# Patient Record
Sex: FEMALE | Race: WHITE | NOT HISPANIC OR LATINO | Employment: OTHER | URBAN - METROPOLITAN AREA
[De-identification: names, ages, dates, MRNs, and addresses within clinical notes are randomized per-mention and may not be internally consistent; named-entity substitution may affect disease eponyms.]

---

## 2018-02-20 ENCOUNTER — HOSPITAL ENCOUNTER (OUTPATIENT)
Dept: RADIOLOGY | Facility: OTHER | Age: 48
Discharge: HOME | End: 2018-02-20
Attending: OBSTETRICS & GYNECOLOGY
Payer: COMMERCIAL

## 2018-02-20 DIAGNOSIS — Z12.31 VISIT FOR SCREENING MAMMOGRAM: ICD-10-CM

## 2018-02-20 PROCEDURE — 77067 SCR MAMMO BI INCL CAD: CPT | Mod: TC

## 2018-02-20 PROCEDURE — 77063 BREAST TOMOSYNTHESIS BI: CPT | Mod: 26,,, | Performed by: RADIOLOGY

## 2018-02-20 PROCEDURE — 77067 SCR MAMMO BI INCL CAD: CPT | Mod: 26,,, | Performed by: RADIOLOGY

## 2018-04-03 ENCOUNTER — TELEPHONE (OUTPATIENT)
Dept: SURGERY | Facility: CLINIC | Age: 48
End: 2018-04-03

## 2018-04-03 NOTE — TELEPHONE ENCOUNTER
Received call from Dr. Hernandez's office regarding patient's TC score and need for referral to high risk clinic. They requested I call patient to discuss    Called patient and discussed her TC score, what it represents, and how it is calculated. Patient and I discussed the additional monitoring and timeline for this. She would like to think about whether or not she would like to enroll in the program. She states she may call Dr. Hernandez's office to discuss further as well. Gave her my number and encouraged her to call me with any questions/concerns or to get scheduled. Verbalized understanding.

## 2019-03-20 ENCOUNTER — TELEPHONE (OUTPATIENT)
Dept: SURGERY | Facility: CLINIC | Age: 49
End: 2019-03-20

## 2019-03-20 NOTE — TELEPHONE ENCOUNTER
Contacted the patient regarding scheduling high risk screening appointment per 's office.  The patient did not answer.  My name and direct number left on the patient's voicemail for the patient to contact our office regarding this matter.

## 2019-03-22 ENCOUNTER — TELEPHONE (OUTPATIENT)
Dept: SURGERY | Facility: CLINIC | Age: 49
End: 2019-03-22

## 2019-03-22 NOTE — TELEPHONE ENCOUNTER
The patient returned my call regarding scheduling high risk screening appointment.  The patient is scheduled to be seen on Monday 4/8/19 with Reese oMrel NP at 3:30 pm.  The patient voiced understanding of appointment date, time, and location.  Reminder letter mailed to the patient.

## 2019-04-08 ENCOUNTER — OFFICE VISIT (OUTPATIENT)
Dept: SURGERY | Facility: CLINIC | Age: 49
End: 2019-04-08
Payer: COMMERCIAL

## 2019-04-08 VITALS
HEIGHT: 64 IN | TEMPERATURE: 99 F | HEART RATE: 71 BPM | SYSTOLIC BLOOD PRESSURE: 108 MMHG | WEIGHT: 141 LBS | DIASTOLIC BLOOD PRESSURE: 67 MMHG | BODY MASS INDEX: 24.07 KG/M2

## 2019-04-08 DIAGNOSIS — R92.30 DENSE BREAST TISSUE ON MAMMOGRAM: ICD-10-CM

## 2019-04-08 DIAGNOSIS — Z80.3 FAMILY HISTORY OF BREAST CANCER: ICD-10-CM

## 2019-04-08 DIAGNOSIS — Z91.89 AT HIGH RISK FOR BREAST CANCER: ICD-10-CM

## 2019-04-08 DIAGNOSIS — Z12.39 BREAST CANCER SCREENING, HIGH RISK PATIENT: Primary | ICD-10-CM

## 2019-04-08 DIAGNOSIS — N63.10 BILATERAL BREAST LUMP: ICD-10-CM

## 2019-04-08 DIAGNOSIS — N63.20 BILATERAL BREAST LUMP: ICD-10-CM

## 2019-04-08 PROCEDURE — 3008F PR BODY MASS INDEX (BMI) DOCUMENTED: ICD-10-PCS | Mod: CPTII,S$GLB,, | Performed by: NURSE PRACTITIONER

## 2019-04-08 PROCEDURE — 3008F BODY MASS INDEX DOCD: CPT | Mod: CPTII,S$GLB,, | Performed by: NURSE PRACTITIONER

## 2019-04-08 PROCEDURE — 99202 PR OFFICE/OUTPT VISIT, NEW, LEVL II, 15-29 MIN: ICD-10-PCS | Mod: S$GLB,,, | Performed by: NURSE PRACTITIONER

## 2019-04-08 PROCEDURE — 99999 PR PBB SHADOW E&M-EST. PATIENT-LVL III: ICD-10-PCS | Mod: PBBFAC,,, | Performed by: NURSE PRACTITIONER

## 2019-04-08 PROCEDURE — 99999 PR PBB SHADOW E&M-EST. PATIENT-LVL III: CPT | Mod: PBBFAC,,, | Performed by: NURSE PRACTITIONER

## 2019-04-08 PROCEDURE — 99202 OFFICE O/P NEW SF 15 MIN: CPT | Mod: S$GLB,,, | Performed by: NURSE PRACTITIONER

## 2019-04-08 RX ORDER — ESCITALOPRAM OXALATE 20 MG/1
TABLET ORAL
COMMUNITY
Start: 2019-03-20 | End: 2022-09-23 | Stop reason: SDUPTHER

## 2019-04-08 NOTE — PROGRESS NOTES
Patient ID: Kamilah Romero is a 48 y.o. female.    Chief Complaint: Consult (New Patient High Risk TC score 22.17 %)        HPI:  New patient to the breast center presents today for increased risk of breast cancer.    Patient reports L breast LIQ lump, longstanding and unchanged.    Patient denies breast pain, breast-related skin changes, nipple discharge and nipple retraction.      Breast History:    Personal history of breast cancer:  no  Personal history of breast biopsy:  no  Personal history of breast surgery:  no    Breast Imaging  Last mmg 18 report reviewed:  Extremely dense breasts  BI-RADS 1  TC lifetime risk 22.17%    GYN History:  Age of menarche:  unknown  Uterus and ovaries:  intact  LMP:  3/11/19   HRT usage:  never   (had 2 abortions and 1 miscarriage), first live birth at 40 y/o    Other Oncologic History:    Personal history of other cancer not abovementioned:  History of basal and squamous cell skin ca, never melanoma; sees Dermatology every 4-6 mos    Personal history of genetic testing:  no    Family Oncologic History:    Ashkenazi [employer] ancestry:  yes    Family History   Problem Relation Age of Onset    Breast cancer Paternal Grandmother         unk age of onset    Cancer Maternal Grandfather         stomach ca (possibly)    Ovarian cancer Neg Hx      History of genetic testing in relatives:  no      Patient's Breast Cancer Risk Assessment Scores:  Taking into account the above information, the patient's breast cancer risk assessment scores were calculated today as follows:  Mauricio lifetime risk:  32.6%      Review of Systems - See HPI.  Negative for recurrent illness or unexplained fatigue.  Objective:   Physical Exam   Pulmonary/Chest: Effort normal. No respiratory distress. She exhibits no mass, no edema and no deformity. Right breast exhibits mass. Right breast exhibits no inverted nipple, no nipple discharge, no skin change and no tenderness. Left breast exhibits mass.  Left breast exhibits no inverted nipple, no nipple discharge, no skin change and no tenderness. No breast swelling.   Left breast 8oc region about 6cmfn with patient-palpated lump also appreciated by me, 1cm, flat/round, mobile, smooth, nontender.  Right breast 3-4oc region about 4-7cmfn with soft area of thickened tissue, only slightly asymmetrical as compared to corresponding contralateral region; 2.5cm x 2.5cm, flat, soft, mobile, nontender.  Both of above are of low clinical suspicion.   Genitourinary: No breast swelling.   Lymphadenopathy:     She has no cervical adenopathy.        Right: No supraclavicular adenopathy present.        Left: No supraclavicular adenopathy present.   R axilla with a couple of subcentimeter, smooth, round, mobile, nontender LNs appreciated.  L axilla with 1 LN appreciated, approximately 10-12mm in size, soft, mobile, nontender.  Both of above are of low clinical suspicion and likely only appreciable secondary to pt's thin habitus.     Assessment:       1. Breast cancer screening, high risk patient    2. At high risk for breast cancer    3. Dense breast tissue on mammogram    4. Bilateral breast lump    5. Family history of breast cancer          Plan:     HIGH-RISK COUNSELING    Counseled patient regarding her being at increased risk for breast cancer based on risk factors which patient and I discussed today and which were factored into the Tyrer-Cuzick risk assessment model, which estimated a lifetime risk of breast cancer of 32.6% for this patient as of today.  Discussed with patient that there are several models available for stratifying breast cancer risk, and Tyrer-Cuzick is presently the model utilized by [facility] [facility] and is a model recommended per current NCCN guidelines.      Counseled patient regarding modifiable risk factors for breast cancer as recommended by NCCN, including exercising, maintaining a healthy BMI, limiting alcohol consumption to less than one  drink per day, and refraining from smoking.      Discussed with patient current NCCN guideline recommendations for increased breast cancer screening in individuals with a lifetime risk of breast cancer >20%, to include semiannual CBE, along with annual mammogram and annual breast MRI, which would alternate.  Risks and benefits of increased screening with breast MRI were discussed.    Discussed with patient option for genetic counseling through [facility].    Discussed with patient option of speaking with Medical Oncology regarding taking a pharmacologic agent such as tamoxifen or an AI to reduce breast cancer risk.     PLAN    After a thorough discussion, patient elects to proceed with alternating annual mammogram and annual breast MRI along with semiannual CBEs and a referral to [facility] for genetic counseling.    Bilat mmg due now.  Order for this was placed, along with order for bilat breast ltd US for B axilla, R LIQ, and L LIQ findings, all of low clinical suspicion.  If negative/benign, RTC in 6 mos.    Order placed for bilateral breast MRI, to occur in 6 mos along with CBE.  Advised patient to contact the pricing transparency line (phone number provided), prior to undergoing breast MRI, to determine her out-of-pocket cost.  Advised patient to RTC in 6 mos for CBE regardless of whether she elects to undergo breast MRI.  Discussed with pt that she absolutely cannot undergo breast MRI with gadolinium if pregnant and that she must notify me if any chance of pregnancy prior to breast MRI so that a UPT can be performed on the day of the breast MRI, before the breast MRI; pt stated no chance of pregnancy and declined UPT on day of breast MRI.    [facility] paperwork initiated.    Patient declines an appointment in Medical Oncology to discuss the possibility of chemoprevention at this time and was advised to contact clinic with any changes in her decision.    Encouraged breast awareness, including monthly breast  self-exams.  Advised patient to RTC with any interval changes or concerns.  Questions were encouraged and answered to patient's satisfaction, and patient verbalized understanding of information and agreement with the plan.    Time in counselin mins  Total time:  49 mins  >50% of time was spent in face-to-face counseling.

## 2019-04-24 ENCOUNTER — HOSPITAL ENCOUNTER (OUTPATIENT)
Dept: RADIOLOGY | Facility: HOSPITAL | Age: 49
Discharge: HOME | End: 2019-04-24
Attending: NURSE PRACTITIONER
Payer: COMMERCIAL

## 2019-04-24 DIAGNOSIS — N63.10 BILATERAL BREAST LUMP: ICD-10-CM

## 2019-04-24 DIAGNOSIS — N63.20 BILATERAL BREAST LUMP: ICD-10-CM

## 2019-04-24 DIAGNOSIS — Z12.39 BREAST CANCER SCREENING, HIGH RISK PATIENT: ICD-10-CM

## 2019-04-24 PROCEDURE — 76642 ULTRASOUND BREAST LIMITED: CPT | Mod: 26,RT,, | Performed by: RADIOLOGY

## 2019-04-24 PROCEDURE — 77062 BREAST TOMOSYNTHESIS BI: CPT | Mod: TC,PO

## 2019-04-24 PROCEDURE — 76642 ULTRASOUND BREAST LIMITED: CPT | Mod: 26,LT,, | Performed by: RADIOLOGY

## 2019-04-24 PROCEDURE — 76642 PR US BREAST UNILAT LIMITED: ICD-10-PCS | Mod: 26,LT,, | Performed by: RADIOLOGY

## 2019-04-24 PROCEDURE — 77062 MAMMO DIGITAL DIAGNOSTIC BILAT WITH TOMOSYNTHESIS_CAD: ICD-10-PCS | Mod: 26,,, | Performed by: RADIOLOGY

## 2019-04-24 PROCEDURE — 77066 DX MAMMO INCL CAD BI: CPT | Mod: 26,,, | Performed by: RADIOLOGY

## 2019-04-24 PROCEDURE — 77066 MAMMO DIGITAL DIAGNOSTIC BILAT WITH TOMOSYNTHESIS_CAD: ICD-10-PCS | Mod: 26,,, | Performed by: RADIOLOGY

## 2019-04-24 PROCEDURE — 76642 ULTRASOUND BREAST LIMITED: CPT | Mod: TC,50,PO

## 2019-04-24 PROCEDURE — 77062 BREAST TOMOSYNTHESIS BI: CPT | Mod: 26,,, | Performed by: RADIOLOGY

## 2019-04-24 NOTE — PROGRESS NOTES
Aida,    Pt to RTC in around 10/8/19 for breast MRI along with CBE.     Please call pt to make sure she is aware of negative breast US and mmg results.    Thanks,  Reese

## 2019-05-01 ENCOUNTER — DOCUMENTATION ONLY (OUTPATIENT)
Dept: SURGERY | Facility: CLINIC | Age: 49
End: 2019-05-01

## 2019-05-01 NOTE — PROGRESS NOTES
Received fax from [facility] advising that pt has not returned [facility]'s multiple phone calls to schedule genetic counseling appt and that pt can contact their Patient Care team at [phone] if she wishes to initiate services again.  See Media for full notice.

## 2019-07-01 ENCOUNTER — OFFICE VISIT (OUTPATIENT)
Dept: NEUROLOGY | Facility: CLINIC | Age: 49
End: 2019-07-01
Payer: COMMERCIAL

## 2019-07-01 VITALS
DIASTOLIC BLOOD PRESSURE: 77 MMHG | BODY MASS INDEX: 24.1 KG/M2 | WEIGHT: 141.13 LBS | HEIGHT: 64 IN | HEART RATE: 58 BPM | SYSTOLIC BLOOD PRESSURE: 112 MMHG

## 2019-07-01 DIAGNOSIS — G43.109 MIGRAINE WITH AURA AND WITHOUT STATUS MIGRAINOSUS, NOT INTRACTABLE: Primary | ICD-10-CM

## 2019-07-01 PROCEDURE — 3008F BODY MASS INDEX DOCD: CPT | Mod: CPTII,S$GLB,, | Performed by: PSYCHIATRY & NEUROLOGY

## 2019-07-01 PROCEDURE — 99999 PR PBB SHADOW E&M-EST. PATIENT-LVL III: CPT | Mod: PBBFAC,,, | Performed by: PSYCHIATRY & NEUROLOGY

## 2019-07-01 PROCEDURE — 99204 PR OFFICE/OUTPT VISIT, NEW, LEVL IV, 45-59 MIN: ICD-10-PCS | Mod: S$GLB,,, | Performed by: PSYCHIATRY & NEUROLOGY

## 2019-07-01 PROCEDURE — 3008F PR BODY MASS INDEX (BMI) DOCUMENTED: ICD-10-PCS | Mod: CPTII,S$GLB,, | Performed by: PSYCHIATRY & NEUROLOGY

## 2019-07-01 PROCEDURE — 99204 OFFICE O/P NEW MOD 45 MIN: CPT | Mod: S$GLB,,, | Performed by: PSYCHIATRY & NEUROLOGY

## 2019-07-01 PROCEDURE — 99999 PR PBB SHADOW E&M-EST. PATIENT-LVL III: ICD-10-PCS | Mod: PBBFAC,,, | Performed by: PSYCHIATRY & NEUROLOGY

## 2019-07-01 RX ORDER — METHYLPREDNISOLONE 4 MG/1
TABLET ORAL
Qty: 1 PACKAGE | Refills: 0 | Status: SHIPPED | OUTPATIENT
Start: 2019-07-01 | End: 2022-01-06

## 2019-07-01 RX ORDER — SUMATRIPTAN 50 MG/1
50 TABLET, FILM COATED ORAL
Qty: 9 TABLET | Refills: 3 | Status: SHIPPED | OUTPATIENT
Start: 2019-07-01 | End: 2022-08-16

## 2019-07-01 NOTE — PROGRESS NOTES
Subjective:       Patient ID: Kamilah Romero is a 49 y.o. female.    Chief Complaint:  Headache      Consultation Requested by:   Aaareferral Self  No address on file    History of Present Illness  49-year-old female presents for evaluation of 3 weeks of migraine.  The patient notes that previously when she was younger she would have headaches.  She notes that she tried an elimination diet and she notes that not only did her auras go away but her headaches went away as well.  She notes that she started having headaches for 3 weeks starting about a month ago.  She notes that there were no longer food triggered.  She notes that they were not caffeine triggered.  She notes that the major difference was that they were slightly less severe than the headaches that she used to experience several decades ago.  She notes that the pain itself is bitemporal but the aura can occur on the right or on the left in terms of blurring of vision.  She notes that she is noise sensitive and light sensitive with nausea and at 1 point had to make herself vomit to improve her headache.  She was prescribed Maxalt 1st which was ineffective for her recently and Imitrex afterwards which she feels was more effective.  She notes that Advil and Tylenol were ineffective for.  She notes that she has not had a headache for a week.  She notes that about a month ago she had an irregular menstrual cycle.  She denies any blindness, weakness, facial droop, focal neurologic deficit.    History reviewed. No pertinent past medical history.    History reviewed. No pertinent surgical history.    Family History   Problem Relation Age of Onset    Breast cancer Paternal Grandmother         unk age of onset    Cancer Maternal Grandfather         stomach ca (possibly)    Ovarian cancer Neg Hx        Social History     Socioeconomic History    Marital status:      Spouse name: Not on file    Number of children: Not on file    Years of education: Not  on file    Highest education level: Not on file   Occupational History    Not on file   Social Needs    Financial resource strain: Not on file    Food insecurity:     Worry: Not on file     Inability: Not on file    Transportation needs:     Medical: Not on file     Non-medical: Not on file   Tobacco Use    Smoking status: Current Every Day Smoker    Smokeless tobacco: Never Used   Substance and Sexual Activity    Alcohol use: Yes     Alcohol/week: 4.2 oz     Types: 7 Glasses of wine per week    Drug use: Not on file    Sexual activity: Not on file   Lifestyle    Physical activity:     Days per week: Not on file     Minutes per session: Not on file    Stress: Not on file   Relationships    Social connections:     Talks on phone: Not on file     Gets together: Not on file     Attends Pentecostal service: Not on file     Active member of club or organization: Not on file     Attends meetings of clubs or organizations: Not on file     Relationship status: Not on file   Other Topics Concern    Not on file   Social History Narrative    Not on file       Review of Systems  Review of Systems   Eyes: Positive for photophobia and visual disturbance.   Musculoskeletal: Positive for arthralgias and neck stiffness.   Neurological: Positive for headaches.   Psychiatric/Behavioral: Positive for decreased concentration and sleep disturbance.       Objective:     Vitals:    07/01/19 1242   BP: 112/77   Pulse: (!) 58      Physical Exam   Constitutional: She is oriented to person, place, and time. She appears well-developed and well-nourished. No distress.   HENT:   Head: Normocephalic and atraumatic.   Right Ear: Hearing normal.   Left Ear: Hearing normal.   Eyes: Pupils are equal, round, and reactive to light. EOM are normal. Right eye exhibits normal extraocular motion and no nystagmus. Left eye exhibits normal extraocular motion and no nystagmus.   Neck: Normal range of motion. Neck supple. No spinous process  tenderness and no muscular tenderness present. Carotid bruit is not present. No neck rigidity.   Cardiovascular: Exam reveals no S4.   Musculoskeletal: Normal range of motion.   Neurological: She is alert and oriented to person, place, and time. She has normal strength and normal reflexes. She displays no atrophy and no tremor. No cranial nerve deficit or sensory deficit. She exhibits normal muscle tone. She has a normal Finger-Nose-Finger Test and a normal Heel to Lee Test. She displays a negative Romberg sign. Gait normal. Coordination and gait normal. GCS eye subscore is 4. GCS verbal subscore is 5. GCS motor subscore is 6.   Reflex Scores:       Tricep reflexes are 2+ on the right side and 2+ on the left side.       Bicep reflexes are 2+ on the right side and 2+ on the left side.       Brachioradialis reflexes are 2+ on the right side and 2+ on the left side.       Patellar reflexes are 2+ on the right side and 2+ on the left side.       Achilles reflexes are 2+ on the right side and 2+ on the left side.  Fundoscopic exam shows no papilledema, no hemorrhage, no exudates bilaterally.    Cranial nerves 2-12 are without deficit.   Psychiatric: She has a normal mood and affect. Her speech is normal and behavior is normal. Thought content normal.   Vitals reviewed.        NEUROLOGICAL EXAMINATION:     MENTAL STATUS   Oriented to person, place, and time.   Attention: normal. Concentration: normal.   Speech: speech is normal   Level of consciousness: alert  Knowledge: good.   Able to name object. Able to read. Able to repeat. Able to write.     CRANIAL NERVES   Cranial nerves II through XII intact.     CN II   Visual fields full to confrontation.   Visual acuity: normal    CN III, IV, VI   Pupils are equal, round, and reactive to light.  Extraocular motions are normal.     MOTOR EXAM   Muscle bulk: normal  Overall muscle tone: normal  Right arm tone: normal  Left arm tone: normal  Right leg tone: normal  Left leg  tone: normal    Strength   Strength 5/5 throughout.     REFLEXES     Reflexes   Right brachioradialis: 2+  Left brachioradialis: 2+  Right biceps: 2+  Left biceps: 2+  Right triceps: 2+  Left triceps: 2+  Right patellar: 2+  Left patellar: 2+  Right achilles: 2+  Left achilles: 2+  Right : 2+  Left : 2+  Right plantar: normal  Left plantar: normal    SENSORY EXAM   Light touch normal.   Vibration normal.   Proprioception normal.   Pinprick normal.     GAIT AND COORDINATION     Gait  Gait: normal     Coordination   Finger to nose coordination: normal  Heel to shin coordination: normal   I spent over 50% of a 45 min visit in guidance, counseling discussion of treatment options.  Assessment/Plan:     Problem List Items Addressed This Visit        Neuro    Migraine with aura and without status migrainosus, not intractable - Primary    Overview     Plan for Medrol for status migrainosus  Sumatriptan 50mg prn  Has tried Maxalt, Tylenol         Relevant Medications    methylPREDNISolone (MEDROL DOSEPACK) 4 mg tablet    sumatriptan (IMITREX) 50 MG tablet        49-year-old female presents for evaluation of migraine headaches which have worsened in the last month.  At this time we have discussed her keeping a headache diary and watching the pattern of her headaches.  She just started having her menstrual cycle again yesterday but it seemed more normal for her.  She notes some blurring of her vision in her right eye currently but notes no headache today.  At this time we have discussed refilling her Imitrex to be used as needed.  I will also give her a Medrol Dosepak for her to use if she starts having headache that last more than 48 hr at a time.  I will have her track her headaches for 60 days.  At the end of that time I will have her come back to clinic and we will discuss her headache diary.  If she is having more than 6 days of headache per month then we would consider a daily preventive agent.  We have discussed  supplements for migraine at length today and I have given this to her on the after visit summary.  We have discussed the pathophysiology of migraine in the mechanism of action of the medications at length today.      The patient verbalizes understanding and agreement with the treatment plan. Questions were sought and answered to her stated verbal satisfaction.        Alice Sanchez MD    This note is dictated on Dragon Natural Speaking word recognition program. There are word recognition mistakes that are occasionally missed on review.

## 2020-06-11 DIAGNOSIS — Z91.89 AT HIGH RISK FOR BREAST CANCER: Primary | ICD-10-CM

## 2020-07-28 ENCOUNTER — HOSPITAL ENCOUNTER (OUTPATIENT)
Dept: RADIOLOGY | Facility: OTHER | Age: 50
Discharge: HOME | End: 2020-07-28
Attending: PHYSICIAN ASSISTANT
Payer: COMMERCIAL

## 2020-07-28 VITALS — WEIGHT: 141.13 LBS | BODY MASS INDEX: 24.1 KG/M2 | HEIGHT: 64 IN

## 2020-07-28 DIAGNOSIS — Z12.31 VISIT FOR SCREENING MAMMOGRAM: ICD-10-CM

## 2020-07-28 DIAGNOSIS — Z91.89 AT HIGH RISK FOR BREAST CANCER: ICD-10-CM

## 2020-07-28 PROCEDURE — 77063 MAMMO DIGITAL SCREENING BILAT WITH TOMOSYNTHESIS_CAD: ICD-10-PCS | Mod: 26,,, | Performed by: RADIOLOGY

## 2020-07-28 PROCEDURE — 77067 SCR MAMMO BI INCL CAD: CPT | Mod: 26,,, | Performed by: RADIOLOGY

## 2020-07-28 PROCEDURE — 77067 MAMMO DIGITAL SCREENING BILAT WITH TOMOSYNTHESIS_CAD: ICD-10-PCS | Mod: 26,,, | Performed by: RADIOLOGY

## 2020-07-28 PROCEDURE — 77063 BREAST TOMOSYNTHESIS BI: CPT | Mod: 26,,, | Performed by: RADIOLOGY

## 2020-07-28 PROCEDURE — 77067 SCR MAMMO BI INCL CAD: CPT | Mod: TC

## 2020-08-07 ENCOUNTER — TELEPHONE (OUTPATIENT)
Dept: SURGERY | Facility: CLINIC | Age: 50
End: 2020-08-07

## 2020-08-07 NOTE — TELEPHONE ENCOUNTER
Returned patients call. Informed patient that I will speak to LINDA Pack on Monday to verify upcoming appointments with radiology and will get back to her on Monday. Patient verbalized understanding.     ----- Message from Phillip Griggs sent at 8/7/2020 12:11 PM CDT -----  Regarding: call back  Patient called in to speak with someone at the office regarding her upcoming appointment. She would like a call back from the office and can be reached at    [phone]

## 2020-08-10 ENCOUNTER — DOCUMENTATION ONLY (OUTPATIENT)
Dept: SURGERY | Facility: CLINIC | Age: 50
End: 2020-08-10

## 2020-08-10 NOTE — PROGRESS NOTES
Called patient with mammogram results. We discussed that she needs a diagnostic mammogram and u/s for further assessment. This is scheduled for 8/13 but patient wishes to reschedule it for a Wednesday at [facility] and wishes to see me that same day for high risk follow up.    -Sirena

## 2020-08-11 ENCOUNTER — TELEPHONE (OUTPATIENT)
Dept: SURGERY | Facility: CLINIC | Age: 50
End: 2020-08-11

## 2020-08-11 NOTE — TELEPHONE ENCOUNTER
Message was leftfor patient to please call Aida felix @ [facility] @[phone] . Regarding scheduling a High Risk apt with Sirena MCKEON. Sirena only has clinic @ [facility] on a Wednesday .They only do Breast Imaging @ [facility] on Monday & Wednesday so the apt can't be on the same day as the apt . How ever we do have a Breast provider that is @ [facility] on Thursday . If this would work better for you please just let me know.

## 2020-08-11 NOTE — TELEPHONE ENCOUNTER
Message was left for Ms.Melanie Romero regarding her Mammogram apt on 8/13/20 10:30 am @[facility] [address] [facility] [place] [facility] for 11:15 am also. Please call Aida back if you have any questions @ [phone]. Have a great day and please stay safe.

## 2020-08-12 ENCOUNTER — TELEPHONE (OUTPATIENT)
Dept: SURGERY | Facility: CLINIC | Age: 50
End: 2020-08-12

## 2020-08-12 NOTE — TELEPHONE ENCOUNTER
Returned call several times to Ms.Kamilah Romero regarding her message wanting to cancel her apt's for her Breast Imaging on 8/13/20 @ [facility]. Patient's Breast Imaging was canceled . Patient asked to be scheduled to see Sirena MCKEON on 08/26/20 @ 11 am  Patients new apt slip has been mailed out.

## 2020-08-24 ENCOUNTER — TELEPHONE (OUTPATIENT)
Dept: SURGERY | Facility: CLINIC | Age: 50
End: 2020-08-24

## 2020-08-24 NOTE — TELEPHONE ENCOUNTER
Called and left patient a detailed message about moving up her appt with Sirena Hancock to 10:30am from 11am due to the impending weather. She did not answer, left her a brief message with my direct call back number.

## 2020-08-25 ENCOUNTER — TELEPHONE (OUTPATIENT)
Dept: SURGERY | Facility: CLINIC | Age: 50
End: 2020-08-25

## 2020-08-25 NOTE — TELEPHONE ENCOUNTER
Message was left for  to please call Aida felix @ [phone] ext [phone] regarding her apt's tomorrow @ [facility]  to see Sirena Hancock. Have a good afternoon.

## 2020-08-26 ENCOUNTER — TELEPHONE (OUTPATIENT)
Dept: SURGERY | Facility: CLINIC | Age: 50
End: 2020-08-26

## 2020-08-26 NOTE — TELEPHONE ENCOUNTER
Returned the patient's call, she was upset that her appointments were in different locations when she wanted everything at [facility]. Expressed my apologies and explained breast imaging is only done on Mondays and Thursdays at [facility]. She has been rescheduled for 9/17 with Johanna Hinojosa as well as her imaging. She voiced thanks and understanding. Appt letter sent.    ----- Message from Glynn Gustafson sent at 8/26/2020 10:40 AM CDT -----  Regarding: call back        The very irritated Pt would like a call back about what happened with her appts.  The Pt states that Debra is supposed to be calling her back.    Phone # [phone]

## 2020-09-17 ENCOUNTER — HOSPITAL ENCOUNTER (OUTPATIENT)
Dept: RADIOLOGY | Facility: OTHER | Age: 50
Discharge: HOME | End: 2020-09-17
Attending: PHYSICIAN ASSISTANT
Payer: COMMERCIAL

## 2020-09-17 DIAGNOSIS — R92.8 ABNORMAL MAMMOGRAM: ICD-10-CM

## 2020-09-17 PROCEDURE — 77061 BREAST TOMOSYNTHESIS UNI: CPT | Mod: 26,RT,, | Performed by: RADIOLOGY

## 2020-09-17 PROCEDURE — 77061 MAMMO DIGITAL DIAGNOSTIC RIGHT WITH TOMOSYNTHESIS_CAD: ICD-10-PCS | Mod: 26,RT,, | Performed by: RADIOLOGY

## 2020-09-17 PROCEDURE — 77065 MAMMO DIGITAL DIAGNOSTIC RIGHT WITH TOMOSYNTHESIS_CAD: ICD-10-PCS | Mod: 26,RT,, | Performed by: RADIOLOGY

## 2020-09-17 PROCEDURE — 76642 US BREAST RIGHT LIMITED: ICD-10-PCS | Mod: 26,RT,, | Performed by: RADIOLOGY

## 2020-09-17 PROCEDURE — 76642 ULTRASOUND BREAST LIMITED: CPT | Mod: TC,RT

## 2020-09-17 PROCEDURE — 77065 DX MAMMO INCL CAD UNI: CPT | Mod: 26,RT,, | Performed by: RADIOLOGY

## 2020-09-17 PROCEDURE — 76642 ULTRASOUND BREAST LIMITED: CPT | Mod: 26,RT,, | Performed by: RADIOLOGY

## 2020-09-17 PROCEDURE — 77065 DX MAMMO INCL CAD UNI: CPT | Mod: TC,RT

## 2020-09-24 ENCOUNTER — HOSPITAL ENCOUNTER (OUTPATIENT)
Dept: RADIOLOGY | Facility: OTHER | Age: 50
Discharge: HOME | End: 2020-09-24
Attending: PHYSICIAN ASSISTANT
Payer: COMMERCIAL

## 2020-09-24 DIAGNOSIS — R92.8 ABNORMAL MAMMOGRAM: ICD-10-CM

## 2020-09-24 DIAGNOSIS — N63.0 BREAST MASS: Primary | ICD-10-CM

## 2020-09-24 PROCEDURE — 25000003 PHARM REV CODE 250: Performed by: PHYSICIAN ASSISTANT

## 2020-09-24 PROCEDURE — 88305 TISSUE EXAM BY PATHOLOGIST: ICD-10-PCS | Mod: 26,,, | Performed by: PATHOLOGY

## 2020-09-24 PROCEDURE — 19083 BX BREAST 1ST LESION US IMAG: CPT | Mod: RT,,, | Performed by: RADIOLOGY

## 2020-09-24 PROCEDURE — 19083 US BREAST BIOPSY WITH IMAGING 1ST SITE RIGHT: ICD-10-PCS | Mod: RT,,, | Performed by: RADIOLOGY

## 2020-09-24 PROCEDURE — 27201068 US BREAST BIOPSY WITH IMAGING 1ST SITE RIGHT

## 2020-09-24 PROCEDURE — 88305 TISSUE EXAM BY PATHOLOGIST: CPT | Mod: 26,,, | Performed by: PATHOLOGY

## 2020-09-24 PROCEDURE — 88305 TISSUE EXAM BY PATHOLOGIST: CPT | Performed by: PATHOLOGY

## 2020-09-24 RX ORDER — LIDOCAINE HYDROCHLORIDE AND EPINEPHRINE 20; 10 MG/ML; UG/ML
10 INJECTION, SOLUTION INFILTRATION; PERINEURAL ONCE
Status: COMPLETED | OUTPATIENT
Start: 2020-09-24 | End: 2020-09-24

## 2020-09-24 RX ORDER — LIDOCAINE HYDROCHLORIDE 10 MG/ML
5 INJECTION INFILTRATION; PERINEURAL ONCE
Status: COMPLETED | OUTPATIENT
Start: 2020-09-24 | End: 2020-09-24

## 2020-09-24 RX ADMIN — LIDOCAINE HYDROCHLORIDE 5 ML: 10 INJECTION, SOLUTION INFILTRATION; PERINEURAL at 10:09

## 2020-09-24 RX ADMIN — LIDOCAINE HYDROCHLORIDE,EPINEPHRINE BITARTRATE 10 ML: 20; .01 INJECTION, SOLUTION INFILTRATION; PERINEURAL at 10:09

## 2020-09-25 LAB
FINAL PATHOLOGIC DIAGNOSIS: NORMAL
GROSS: NORMAL

## 2020-09-28 ENCOUNTER — TELEPHONE (OUTPATIENT)
Dept: RADIOLOGY | Facility: OTHER | Age: 50
End: 2020-09-28

## 2020-09-28 NOTE — TELEPHONE ENCOUNTER
Patient notified of right breast biopsy results per pathology reported on 9/25/2020. Diagnosis: BENIGN BREAST TISSUE WITH FIBROADENOMATOID CHANGES. Explained to patient results are negative for breast cancer. Instructed on recommendation for BREAST MRI. Patient informed her provider will enter the order and someone will call to schedule the appointment. Questions answered. Discussed with patient the MRI can be more sensitive for findings within the breast. Patient verbalized understanding. Encouraged to call [phone] for further questions or concerns.

## 2020-10-08 ENCOUNTER — PATIENT MESSAGE (OUTPATIENT)
Dept: RADIOLOGY | Facility: HOSPITAL | Age: 50
End: 2020-10-08

## 2020-10-09 DIAGNOSIS — R92.8 ABNORMAL FINDING ON BREAST IMAGING: Primary | ICD-10-CM

## 2020-10-27 ENCOUNTER — TELEPHONE (OUTPATIENT)
Dept: RADIOLOGY | Facility: OTHER | Age: 50
End: 2020-10-27

## 2020-10-27 NOTE — TELEPHONE ENCOUNTER
Patient still outstanding for breast MRI as recommended by radiologist after breast biopsy. Called to follow up. No answer. Left voicemail to return call.

## 2020-10-29 ENCOUNTER — OFFICE VISIT (OUTPATIENT)
Dept: URGENT CARE | Facility: CLINIC | Age: 50
End: 2020-10-29
Payer: COMMERCIAL

## 2020-10-29 VITALS
SYSTOLIC BLOOD PRESSURE: 132 MMHG | TEMPERATURE: 98 F | WEIGHT: 150 LBS | DIASTOLIC BLOOD PRESSURE: 75 MMHG | RESPIRATION RATE: 18 BRPM | OXYGEN SATURATION: 97 % | BODY MASS INDEX: 24.99 KG/M2 | HEIGHT: 65 IN | HEART RATE: 94 BPM

## 2020-10-29 DIAGNOSIS — U07.1 COVID-19 VIRUS DETECTED: ICD-10-CM

## 2020-10-29 DIAGNOSIS — R05.9 COUGH: ICD-10-CM

## 2020-10-29 DIAGNOSIS — U07.1 COVID-19 VIRUS INFECTION: Primary | ICD-10-CM

## 2020-10-29 LAB
CTP QC/QA: YES
SARS-COV-2 RDRP RESP QL NAA+PROBE: POSITIVE

## 2020-10-29 PROCEDURE — U0002 COVID-19 LAB TEST NON-CDC: HCPCS | Mod: QW,S$GLB,, | Performed by: EMERGENCY MEDICINE

## 2020-10-29 PROCEDURE — U0002: ICD-10-PCS | Mod: QW,S$GLB,, | Performed by: EMERGENCY MEDICINE

## 2020-10-29 PROCEDURE — 99203 OFFICE O/P NEW LOW 30 MIN: CPT | Mod: S$GLB,,, | Performed by: EMERGENCY MEDICINE

## 2020-10-29 PROCEDURE — 99203 PR OFFICE/OUTPT VISIT, NEW, LEVL III, 30-44 MIN: ICD-10-PCS | Mod: S$GLB,,, | Performed by: EMERGENCY MEDICINE

## 2020-10-29 RX ORDER — ALBUTEROL SULFATE 90 UG/1
2 AEROSOL, METERED RESPIRATORY (INHALATION) EVERY 4 HOURS PRN
Qty: 18 G | Refills: 0 | Status: SHIPPED | OUTPATIENT
Start: 2020-10-29 | End: 2022-01-06

## 2020-10-29 NOTE — LETTER
[address] [place] [address] ? [place], [address] ? Phone [phone] ? Fax [phone]           Return to Work/School    Patient: Kamilah Romero  YOB: 1970   Date: 10/29/2020      To Whom It May Concern:     Kamilah Romero was in contact with/seen in my office on 10/29/2020. COVID-19 is present in our communities across the state. Not all patients are eligible or appropriate to be tested. In this situation, your employee meets the following criteria:     Kamilah Romero has met the criteria for COVID-19 testing and has a POSITIVE result. She can return to work once they are asymptomatic for 24 hours without the use of fever reducing medications AND at least ten days from the start of symptoms (or from the first positive result if they have no symptoms).      If you have any questions or concerns, or if I can be of further assistance, please do not hesitate to contact me.     Sincerely,      Rudy Hernandez MD

## 2020-10-29 NOTE — PATIENT INSTRUCTIONS
REST AND HYDRATE WITH PLENTY OF FLUIDS  TYLENOL AND IBUPROFEN FOR BODY ACHES/FEVER  MUCINEX DM TWICE DAILY FOR COUGH  ALBUTEROL INHALER RX  COVID SWAB POSITIVE  SEE COVID SHEET    Instructions for Patients with Confirmed or Suspected COVID-19    If you are awaiting your test result, you will either be called or it will be released to the patient portal.  If you have any questions about your test, please visit [email or web address] or call our COVID-19 information line at [phone].      Instructions for non-hospitalized or discharged patients with confirmed or suspected COVID-19:       Stay home except to get medical care.    Separate yourself from other people and animals in your home.    Call ahead before visiting your doctor.    Wear a face mask.    Cover your coughs and sneezes.    Clean your hands often.    Avoid sharing personal household items.    Clean all high-touch surfaces every day.    Monitor your symptoms. Seek prompt medical attention if your illness is worsening (e.g., difficulty breathing). Before seeking care, call your healthcare provider.    If you have a medical emergency and must call 911, notify the dispatcher that you have or are being evaluated for COVID-19. If possible, put on a face mask before emergency medical services arrive.    Use the following symptom-based strategy to return to normal activity following a suspected or confirmed case of COVID-19. Continue isolation until:   o At least 3 days (72 hours) have passed since recovery defined as resolution of fever without the use of fever-reducing medications and improvement in respiratory symptoms (e.g. cough, shortness of breath), and   o At least 10 days have passed since the first positive test.       As one of the next steps, you will receive a call or text from the [place] Department of [facility] ([facility]) COVID-19 Tracing Team. See the contact information below so you know not to ignore the health departments  call. It is important that you contact them back immediately so they can help.     Contact Tracer Number:  [phone]  Caller ID for most carriers: [facility]    What is contact tracing?   Contact tracing is a process that helps identify everyone who has been in close contact with an infected person. Contact tracers let those people know they may have been exposed and guide them on next steps. Confidentiality is important for everyone; no one will be told who may have exposed them to the virus.   Your involvement is important. The more we know about where and how this virus is spreading, the better chance we have at stopping it from spreading further.  What does exposure mean?   Exposure means you have been within 6 feet for more than 15 minutes with a person who has or had COVID-19.  What kind of questions do the contact tracers ask?   A contact tracer will confirm your basic contact information including name, address, phone number, and next of kin, as well as asking about any symptoms you may have had. Theyll also ask you how you think you may have gotten sick, such as places where you may have been exposed to the virus, and people you were with. Those names will never be shared with anyone outside of that call, and will only be used to help trace and stop the spread of the virus.   I have privacy concerns. How will the state use my information?   Your privacy about your health is important. All calls are completed using call centers that use the appropriate health privacy protection measures (HIPAA compliance), meaning that your patient information is safe. No one will ever ask you any questions related to immigration status. Your health comes first.   Do I have to participate?   You do not have to participate, but we strongly encourage you to. Contact tracing can help us catch and control new outbreaks as theyre developing to keep your friends and family safe.   What if I dont hear from  anyone?   If you dont receive a call within 24 hours, you can call the number above right away to inquire about your status. That line is open from 8:00 am - 8:00 p.m., 7 days a week.  Contact tracing saves lives! Together, we have the power to beat this virus and keep our loved ones and neighbors safe.       Instructions for household members, intimate partners and caregivers in a non-healthcare setting of a patient with confirmed or suspected COVID-19:         Close contacts should monitor their health and call their healthcare provider right away if they develop symptoms suggestive of COVID-19 (e.g., fever, cough, shortness of breath).    Stay home except to get medical care. Separate yourself from other people and animals in the home.   Monitor the patients symptoms. If the patient is getting sicker, call his or her healthcare provider. If the patient has a medical emergency and you need to call 911, notify the dispatch personnel that the patient has or is being evaluated for COVID-19.    Wear a facemask when around other people such as sharing a room or vehicle and before entering a healthcare provider's office.   Cover coughs and sneezes with a tissue. Throw used tissues in a lined trash can immediately and wash hands.   Clean hands often with soap and water for at least 20 seconds or with an alcohol-based hand , rubbing hands together until they feel dry. Avoid touching your eyes, nose, and mouth with unwashed hands.   Clean all high-touch; surfaces every day, including counters, tabletops, doorknobs, bathroom fixtures, toilets, phones, keyboards, tablets, bedside tables, etc. Use a household cleaning spray or wipe according to label instructions.   Avoid sharing personal household items such as dishes, drinking glasses, cups, towels, bedding, etc. After these items are used, they should be washed thoroughly with soap and water.   Continue isolation until:   At least 3 days (72 hours)  have passed since recovery defined as resolution of fever without the use of fever-reducing medications and improvement in respiratory symptoms (e.g. cough, shortness of breath), and    At least 10 days have passed since the patients first positive test.    https://www.cdc.gov/coronavirus/2019-ncov/your-health/index.htm  Guidelines for General Prevention of COVID-19    o Take steps to protect yourself from COVID-19. Perform hand hygiene frequently. Wash your hands often with soap and water for at least 20 seconds of use and alcohol-based hand , covering all surfaces of your hands and rubbing them together until they feel dry.  o Avoid touching your eyes, nose, and mouth with unwashed hands.  o Avoid close contact with people and stay home if youre sick, except to get medical care.   o Cover coughs and sneezes with a tissue, or use the inside of your elbow. Immediately wash your hands or use hand .     For more information, see CDC link below:    https://www.cdc.gov/coronavirus/2019-ncov/hcp/guidance-prevent-spread.html#precautions

## 2020-10-29 NOTE — PROGRESS NOTES
"Subjective:       Patient ID: Kamilah Romero is a 50 y.o. female.    Vitals:  height is 5' 4.5" (1.638 m) and weight is 68 kg (150 lb). Her temperature is 97.8 °F (36.6 °C). Her blood pressure is 132/75 and her pulse is 94. Her respiration is 18 and oxygen saturation is 97%.     Chief Complaint: COVID-19 Concerns    This is a 50 y.o. female who presents today with a chief complaint of   Cough, congestion, and body aches. Pt sx started yesterday.     Cough  This is a new problem. The current episode started yesterday. The problem has been gradually worsening. The problem occurs every few minutes. The cough is non-productive. Associated symptoms include headaches and myalgias. Pertinent negatives include no chills, ear pain, eye redness, fever, hemoptysis, rash, sore throat, shortness of breath or wheezing. Nothing aggravates the symptoms. She has tried nothing for the symptoms. The treatment provided no relief.       Constitution: Negative for chills, sweating, fatigue and fever.   HENT: Negative for ear pain, congestion, sinus pain, sinus pressure, sore throat and voice change.    Neck: Negative for painful lymph nodes.   Eyes: Negative for eye redness.   Respiratory: Positive for cough. Negative for chest tightness, sputum production, bloody sputum, COPD, shortness of breath, stridor, wheezing and asthma.    Gastrointestinal: Positive for nausea. Negative for vomiting.   Musculoskeletal: Positive for muscle ache.   Skin: Negative for rash.   Allergic/Immunologic: Negative for seasonal allergies and asthma.   Neurological: Positive for headaches.   Hematologic/Lymphatic: Negative for swollen lymph nodes.       Objective:      Physical Exam   Constitutional: She is oriented to person, place, and time. She appears well-developed. She is cooperative.  Non-toxic appearance. She does not appear ill. No distress.   HENT:   Head: Normocephalic and atraumatic.   Ears:   Right Ear: Hearing, tympanic membrane, external ear " and ear canal normal.   Left Ear: Hearing, tympanic membrane, external ear and ear canal normal.   Nose: Nose normal. No mucosal edema, rhinorrhea or nasal deformity. No epistaxis. Right sinus exhibits no maxillary sinus tenderness and no frontal sinus tenderness. Left sinus exhibits no maxillary sinus tenderness and no frontal sinus tenderness.   Mouth/Throat: Uvula is midline, oropharynx is clear and moist and mucous membranes are normal. No trismus in the jaw. Normal dentition. No uvula swelling. No oropharyngeal exudate, posterior oropharyngeal edema or posterior oropharyngeal erythema.   Eyes: Conjunctivae and lids are normal. No scleral icterus.   Neck: Trachea normal, full passive range of motion without pain and phonation normal. Neck supple. No neck rigidity. No edema and no erythema present.   Cardiovascular: Normal rate, regular rhythm, normal heart sounds and normal pulses.   Pulmonary/Chest: Effort normal and breath sounds normal. No respiratory distress. She has no decreased breath sounds. She has no rhonchi.   Abdominal: Normal appearance.   Musculoskeletal: Normal range of motion.         General: No deformity.   Neurological: She is alert and oriented to person, place, and time. She exhibits normal muscle tone. Coordination normal.   Skin: Skin is warm, dry, intact, not diaphoretic and not pale. Psychiatric: Her speech is normal and behavior is normal. Judgment and thought content normal.   Nursing note and vitals reviewed.        Results for orders placed or performed in visit on 10/29/20   POCT COVID-19 Rapid Screening   Result Value Ref Range    POC Rapid COVID Positive (A) Negative     Acceptable Yes        Assessment:       1. COVID-19 virus infection    2. Cough        Plan:         COVID-19 virus infection    Cough  -     POCT COVID-19 Rapid Screening    Other orders  -     albuterol (PROVENTIL/VENTOLIN HFA) 90 mcg/actuation inhaler; Inhale 2 puffs into the lungs every 4 (four)  hours as needed for Wheezing or Shortness of Breath. Rescue  Dispense: 18 g; Refill: 0         Patient Instructions     REST AND HYDRATE WITH PLENTY OF FLUIDS  TYLENOL AND IBUPROFEN FOR BODY ACHES/FEVER  MUCINEX DM TWICE DAILY FOR COUGH  ALBUTEROL INHALER RX  COVID SWAB POSITIVE  SEE COVID SHEET    Instructions for Patients with Confirmed or Suspected COVID-19    If you are awaiting your test result, you will either be called or it will be released to the patient portal.  If you have any questions about your test, please visit [email or web address] or call our COVID-19 information line at [phone].      Instructions for non-hospitalized or discharged patients with confirmed or suspected COVID-19:       Stay home except to get medical care.    Separate yourself from other people and animals in your home.    Call ahead before visiting your doctor.    Wear a face mask.    Cover your coughs and sneezes.    Clean your hands often.    Avoid sharing personal household items.    Clean all high-touch surfaces every day.    Monitor your symptoms. Seek prompt medical attention if your illness is worsening (e.g., difficulty breathing). Before seeking care, call your healthcare provider.    If you have a medical emergency and must call 911, notify the dispatcher that you have or are being evaluated for COVID-19. If possible, put on a face mask before emergency medical services arrive.    Use the following symptom-based strategy to return to normal activity following a suspected or confirmed case of COVID-19. Continue isolation until:   o At least 3 days (72 hours) have passed since recovery defined as resolution of fever without the use of fever-reducing medications and improvement in respiratory symptoms (e.g. cough, shortness of breath), and   o At least 10 days have passed since the first positive test.       As one of the next steps, you will receive a call or text from the [facility] COVID-19 Tracing Team. See the contact information below so you know not to ignore the health departments call. It is important that you contact them back immediately so they can help.     Contact Tracer Number:  [phone]  Caller ID for most carriers: [facility]    What is contact tracing?   Contact tracing is a process that helps identify everyone who has been in close contact with an infected person. Contact tracers let those people know they may have been exposed and guide them on next steps. Confidentiality is important for everyone; no one will be told who may have exposed them to the virus.   Your involvement is important. The more we know about where and how this virus is spreading, the better chance we have at stopping it from spreading further.  What does exposure mean?   Exposure means you have been within 6 feet for more than 15 minutes with a person who has or had COVID-19.  What kind of questions do the contact tracers ask?   A contact tracer will confirm your basic contact information including name, address, phone number, and next of kin, as well as asking about any symptoms you may have had. Theyll also ask you how you think you may have gotten sick, such as places where you may have been exposed to the virus, and people you were with. Those names will never be shared with anyone outside of that call, and will only be used to help trace and stop the spread of the virus.   I have privacy concerns. How will the state use my information?   Your privacy about your health is important. All calls are completed using call centers that use the appropriate health privacy protection measures (HIPAA compliance), meaning that your patient information is safe. No one will ever ask you any questions related to immigration status. Your health comes first.   Do I have to participate?   You do not have to participate, but we strongly encourage you to. Contact tracing can help us catch  and control new outbreaks as theyre developing to keep your friends and family safe.   What if I dont hear from anyone?   If you dont receive a call within 24 hours, you can call the number above right away to inquire about your status. That line is open from 8:00 am - 8:00 p.m., 7 days a week.  Contact tracing saves lives! Together, we have the power to beat this virus and keep our loved ones and neighbors safe.       Instructions for household members, intimate partners and caregivers in a non-healthcare setting of a patient with confirmed or suspected COVID-19:         Close contacts should monitor their health and call their healthcare provider right away if they develop symptoms suggestive of COVID-19 (e.g., fever, cough, shortness of breath).    Stay home except to get medical care. Separate yourself from other people and animals in the home.   Monitor the patients symptoms. If the patient is getting sicker, call his or her healthcare provider. If the patient has a medical emergency and you need to call 911, notify the dispatch personnel that the patient has or is being evaluated for COVID-19.    Wear a facemask when around other people such as sharing a room or vehicle and before entering a healthcare provider's office.   Cover coughs and sneezes with a tissue. Throw used tissues in a lined trash can immediately and wash hands.   Clean hands often with soap and water for at least 20 seconds or with an alcohol-based hand , rubbing hands together until they feel dry. Avoid touching your eyes, nose, and mouth with unwashed hands.   Clean all high-touch; surfaces every day, including counters, tabletops, doorknobs, bathroom fixtures, toilets, phones, keyboards, tablets, bedside tables, etc. Use a household cleaning spray or wipe according to label instructions.   Avoid sharing personal household items such as dishes, drinking glasses, cups, towels, bedding, etc. After these items are used,  they should be washed thoroughly with soap and water.   Continue isolation until:   At least 3 days (72 hours) have passed since recovery defined as resolution of fever without the use of fever-reducing medications and improvement in respiratory symptoms (e.g. cough, shortness of breath), and    At least 10 days have passed since the patients first positive test.    https://www.cdc.gov/coronavirus/2019-ncov/your-health/index.htm  Guidelines for General Prevention of COVID-19    o Take steps to protect yourself from COVID-19. Perform hand hygiene frequently. Wash your hands often with soap and water for at least 20 seconds of use and alcohol-based hand , covering all surfaces of your hands and rubbing them together until they feel dry.  o Avoid touching your eyes, nose, and mouth with unwashed hands.  o Avoid close contact with people and stay home if youre sick, except to get medical care.   o Cover coughs and sneezes with a tissue, or use the inside of your elbow. Immediately wash your hands or use hand .     For more information, see CDC link below:    https://www.cdc.gov/coronavirus/2019-ncov/hcp/guidance-prevent-spread.html#precautions

## 2020-11-05 ENCOUNTER — PATIENT MESSAGE (OUTPATIENT)
Dept: RADIOLOGY | Facility: OTHER | Age: 50
End: 2020-11-05

## 2020-11-06 ENCOUNTER — TELEPHONE (OUTPATIENT)
Dept: SURGERY | Facility: CLINIC | Age: 50
End: 2020-11-06

## 2020-11-17 ENCOUNTER — HOSPITAL ENCOUNTER (OUTPATIENT)
Dept: RADIOLOGY | Facility: HOSPITAL | Age: 50
Discharge: HOME | End: 2020-11-17
Attending: PHYSICIAN ASSISTANT
Payer: COMMERCIAL

## 2020-11-17 DIAGNOSIS — R92.8 ABNORMAL FINDING ON BREAST IMAGING: ICD-10-CM

## 2020-11-17 PROCEDURE — 25500020 PHARM REV CODE 255: Performed by: PHYSICIAN ASSISTANT

## 2020-11-17 PROCEDURE — A9577 INJ MULTIHANCE: HCPCS | Performed by: PHYSICIAN ASSISTANT

## 2020-11-17 PROCEDURE — 77049 MRI BREAST W/WO CONTRAST, W/CAD, BILATERAL: ICD-10-PCS | Mod: 26,,, | Performed by: RADIOLOGY

## 2020-11-17 PROCEDURE — 77049 MRI BREAST C-+ W/CAD BI: CPT | Mod: TC

## 2020-11-17 PROCEDURE — 77049 MRI BREAST C-+ W/CAD BI: CPT | Mod: 26,,, | Performed by: RADIOLOGY

## 2020-11-17 RX ADMIN — GADOBENATE DIMEGLUMINE 7 ML: 529 INJECTION, SOLUTION INTRAVENOUS at 04:11

## 2020-11-19 ENCOUNTER — TELEPHONE (OUTPATIENT)
Dept: RADIOLOGY | Facility: HOSPITAL | Age: 50
End: 2020-11-19

## 2020-11-19 NOTE — TELEPHONE ENCOUNTER
Spoke with patient. Reviewed breast biopsy procedure and reviewed instructions for breast biopsy. Patient expressed understanding and all questions were answered. Provided patient with my phone number to call for any further concerns or questions.   Patient scheduled breast biopsy at the [facility] on 12/7/2020.

## 2020-11-24 ENCOUNTER — PATIENT MESSAGE (OUTPATIENT)
Dept: RADIOLOGY | Facility: HOSPITAL | Age: 50
End: 2020-11-24

## 2020-12-08 ENCOUNTER — TELEPHONE (OUTPATIENT)
Dept: RADIOLOGY | Facility: HOSPITAL | Age: 50
End: 2020-12-08

## 2020-12-08 NOTE — TELEPHONE ENCOUNTER
Spoke with patient. Reviewed MRI breast biopsy procedure and reviewed instructions for MRI breast biopsy. Patient expressed understanding and all questions were answered. Provided patient with my phone number to call for any further concerns or questions.   Patient scheduled MRI breast biopsy for 12/16/2020.

## 2020-12-16 ENCOUNTER — HOSPITAL ENCOUNTER (OUTPATIENT)
Dept: RADIOLOGY | Facility: HOSPITAL | Age: 50
Discharge: HOME | End: 2020-12-16
Attending: PHYSICIAN ASSISTANT
Payer: COMMERCIAL

## 2020-12-16 DIAGNOSIS — R92.8 ABNORMAL MAMMOGRAM: Primary | ICD-10-CM

## 2020-12-16 DIAGNOSIS — R92.8 ABNORMAL MAMMOGRAM: ICD-10-CM

## 2020-12-16 PROCEDURE — 88341 PR IHC OR ICC EACH ADD'L SINGLE ANTIBODY  STAINPR: ICD-10-PCS | Mod: 26,,, | Performed by: PATHOLOGY

## 2020-12-16 PROCEDURE — 88305 TISSUE EXAM BY PATHOLOGIST: CPT | Mod: 26,,, | Performed by: PATHOLOGY

## 2020-12-16 PROCEDURE — 25500020 PHARM REV CODE 255: Performed by: PHYSICIAN ASSISTANT

## 2020-12-16 PROCEDURE — 19085 MRI BREAST BIOPSY WITH IMAGING 1ST SITE: ICD-10-PCS | Mod: RT,,, | Performed by: RADIOLOGY

## 2020-12-16 PROCEDURE — 25000003 PHARM REV CODE 250: Performed by: PHYSICIAN ASSISTANT

## 2020-12-16 PROCEDURE — 88342 IMHCHEM/IMCYTCHM 1ST ANTB: CPT | Performed by: PATHOLOGY

## 2020-12-16 PROCEDURE — 77065 DX MAMMO INCL CAD UNI: CPT | Mod: TC,RT

## 2020-12-16 PROCEDURE — 88341 IMHCHEM/IMCYTCHM EA ADD ANTB: CPT | Mod: 59 | Performed by: PATHOLOGY

## 2020-12-16 PROCEDURE — 88342 CHG IMMUNOCYTOCHEMISTRY: ICD-10-PCS | Mod: 26,,, | Performed by: PATHOLOGY

## 2020-12-16 PROCEDURE — 88342 IMHCHEM/IMCYTCHM 1ST ANTB: CPT | Mod: 26,,, | Performed by: PATHOLOGY

## 2020-12-16 PROCEDURE — 88305 TISSUE EXAM BY PATHOLOGIST: ICD-10-PCS | Mod: 26,,, | Performed by: PATHOLOGY

## 2020-12-16 PROCEDURE — 88305 TISSUE EXAM BY PATHOLOGIST: CPT | Performed by: PATHOLOGY

## 2020-12-16 PROCEDURE — 19085 BX BREAST 1ST LESION MR IMAG: CPT | Mod: TC

## 2020-12-16 PROCEDURE — A9577 INJ MULTIHANCE: HCPCS | Performed by: PHYSICIAN ASSISTANT

## 2020-12-16 PROCEDURE — 77065 MAMMO DIGITAL DIAGNOSTIC RIGHT: ICD-10-PCS | Mod: 26,RT,, | Performed by: RADIOLOGY

## 2020-12-16 PROCEDURE — 77065 DX MAMMO INCL CAD UNI: CPT | Mod: 26,RT,, | Performed by: RADIOLOGY

## 2020-12-16 PROCEDURE — 19085 BX BREAST 1ST LESION MR IMAG: CPT | Mod: RT,,, | Performed by: RADIOLOGY

## 2020-12-16 PROCEDURE — 88341 IMHCHEM/IMCYTCHM EA ADD ANTB: CPT | Mod: 26,,, | Performed by: PATHOLOGY

## 2020-12-16 PROCEDURE — 27200939 MRI BREAST BIOPSY WITH IMAGING 1ST SITE

## 2020-12-16 RX ORDER — LIDOCAINE HYDROCHLORIDE 10 MG/ML
3 INJECTION, SOLUTION EPIDURAL; INFILTRATION; INTRACAUDAL; PERINEURAL ONCE
Status: COMPLETED | OUTPATIENT
Start: 2020-12-16 | End: 2020-12-16

## 2020-12-16 RX ORDER — LIDOCAINE HYDROCHLORIDE AND EPINEPHRINE 20; 10 MG/ML; UG/ML
18 INJECTION, SOLUTION INFILTRATION; PERINEURAL ONCE
Status: COMPLETED | OUTPATIENT
Start: 2020-12-16 | End: 2020-12-16

## 2020-12-16 RX ADMIN — LIDOCAINE HYDROCHLORIDE 3 ML: 10 INJECTION, SOLUTION EPIDURAL; INFILTRATION; INTRACAUDAL; PERINEURAL at 09:12

## 2020-12-16 RX ADMIN — GADOBENATE DIMEGLUMINE 14 ML: 529 INJECTION, SOLUTION INTRAVENOUS at 08:12

## 2020-12-16 RX ADMIN — LIDOCAINE HYDROCHLORIDE,EPINEPHRINE BITARTRATE 18 ML: 20; .01 INJECTION, SOLUTION INFILTRATION; PERINEURAL at 08:12

## 2020-12-22 LAB
FINAL PATHOLOGIC DIAGNOSIS: NORMAL
GROSS: NORMAL
MICROSCOPIC EXAM: NORMAL

## 2020-12-28 ENCOUNTER — TELEPHONE (OUTPATIENT)
Dept: SURGERY | Facility: CLINIC | Age: 50
End: 2020-12-28

## 2020-12-28 NOTE — TELEPHONE ENCOUNTER
Patient returned call & notified of results of breast biopsy from 12-, no atypia/benign, all questions answered, pt advised to follow up in 6 months with repeat imaging per core biopsy protocol.  reviewed biopsy results and results are benign and concordant. Patient verbalized understanding.

## 2021-04-16 ENCOUNTER — PATIENT MESSAGE (OUTPATIENT)
Dept: RESEARCH | Facility: HOSPITAL | Age: 51
End: 2021-04-16

## 2021-05-03 ENCOUNTER — PATIENT MESSAGE (OUTPATIENT)
Dept: RADIOLOGY | Facility: HOSPITAL | Age: 51
End: 2021-05-03

## 2021-11-30 ENCOUNTER — TELEPHONE (OUTPATIENT)
Dept: OBSTETRICS AND GYNECOLOGY | Facility: CLINIC | Age: 51
End: 2021-11-30
Payer: COMMERCIAL

## 2021-12-01 DIAGNOSIS — Z12.31 ENCOUNTER FOR SCREENING MAMMOGRAM FOR MALIGNANT NEOPLASM OF BREAST: Primary | ICD-10-CM

## 2021-12-02 ENCOUNTER — TELEPHONE (OUTPATIENT)
Dept: OBSTETRICS AND GYNECOLOGY | Facility: CLINIC | Age: 51
End: 2021-12-02
Payer: COMMERCIAL

## 2021-12-17 ENCOUNTER — HOSPITAL ENCOUNTER (OUTPATIENT)
Dept: RADIOLOGY | Facility: OTHER | Age: 51
Discharge: HOME | End: 2021-12-17
Attending: INTERNAL MEDICINE
Payer: COMMERCIAL

## 2021-12-17 DIAGNOSIS — Z12.31 ENCOUNTER FOR SCREENING MAMMOGRAM FOR MALIGNANT NEOPLASM OF BREAST: ICD-10-CM

## 2021-12-17 PROCEDURE — 77063 BREAST TOMOSYNTHESIS BI: CPT | Mod: 26,,, | Performed by: RADIOLOGY

## 2021-12-17 PROCEDURE — 77067 SCR MAMMO BI INCL CAD: CPT | Mod: 26,,, | Performed by: RADIOLOGY

## 2021-12-17 PROCEDURE — 77063 MAMMO DIGITAL SCREENING BILAT WITH TOMO: ICD-10-PCS | Mod: 26,,, | Performed by: RADIOLOGY

## 2021-12-17 PROCEDURE — 77067 MAMMO DIGITAL SCREENING BILAT WITH TOMO: ICD-10-PCS | Mod: 26,,, | Performed by: RADIOLOGY

## 2021-12-17 PROCEDURE — 77067 SCR MAMMO BI INCL CAD: CPT | Mod: TC

## 2022-01-06 ENCOUNTER — CLINICAL SUPPORT (OUTPATIENT)
Dept: AUDIOLOGY | Facility: CLINIC | Age: 52
End: 2022-01-06
Payer: COMMERCIAL

## 2022-01-06 ENCOUNTER — OFFICE VISIT (OUTPATIENT)
Dept: OTOLARYNGOLOGY | Facility: CLINIC | Age: 52
End: 2022-01-06
Payer: COMMERCIAL

## 2022-01-06 VITALS — SYSTOLIC BLOOD PRESSURE: 128 MMHG | HEART RATE: 59 BPM | DIASTOLIC BLOOD PRESSURE: 87 MMHG

## 2022-01-06 DIAGNOSIS — H93.8X2 EAR FULLNESS, LEFT: Primary | ICD-10-CM

## 2022-01-06 DIAGNOSIS — Z01.10 NORMAL HEARING EXAM: ICD-10-CM

## 2022-01-06 DIAGNOSIS — H61.21 IMPACTED CERUMEN OF RIGHT EAR: ICD-10-CM

## 2022-01-06 DIAGNOSIS — H93.293 ABNORMAL AUDITORY PERCEPTION OF BOTH EARS: Primary | ICD-10-CM

## 2022-01-06 PROCEDURE — 92567 PR TYMPA2METRY: ICD-10-PCS | Mod: S$GLB,,, | Performed by: AUDIOLOGIST

## 2022-01-06 PROCEDURE — 99999 PR PBB SHADOW E&M-EST. PATIENT-LVL II: ICD-10-PCS | Mod: PBBFAC,,, | Performed by: NURSE PRACTITIONER

## 2022-01-06 PROCEDURE — 3079F DIAST BP 80-89 MM HG: CPT | Mod: CPTII,S$GLB,, | Performed by: NURSE PRACTITIONER

## 2022-01-06 PROCEDURE — 99203 OFFICE O/P NEW LOW 30 MIN: CPT | Mod: 25,S$GLB,, | Performed by: NURSE PRACTITIONER

## 2022-01-06 PROCEDURE — 1159F MED LIST DOCD IN RCRD: CPT | Mod: CPTII,S$GLB,, | Performed by: NURSE PRACTITIONER

## 2022-01-06 PROCEDURE — 99203 PR OFFICE/OUTPT VISIT, NEW, LEVL III, 30-44 MIN: ICD-10-PCS | Mod: 25,S$GLB,, | Performed by: NURSE PRACTITIONER

## 2022-01-06 PROCEDURE — 1159F PR MEDICATION LIST DOCUMENTED IN MEDICAL RECORD: ICD-10-PCS | Mod: CPTII,S$GLB,, | Performed by: NURSE PRACTITIONER

## 2022-01-06 PROCEDURE — 92557 COMPREHENSIVE HEARING TEST: CPT | Mod: S$GLB,,, | Performed by: AUDIOLOGIST

## 2022-01-06 PROCEDURE — 69210 EAR CERUMEN REMOVAL: ICD-10-PCS | Mod: S$GLB,,, | Performed by: NURSE PRACTITIONER

## 2022-01-06 PROCEDURE — 99999 PR PBB SHADOW E&M-EST. PATIENT-LVL II: CPT | Mod: PBBFAC,,, | Performed by: NURSE PRACTITIONER

## 2022-01-06 PROCEDURE — 69210 REMOVE IMPACTED EAR WAX UNI: CPT | Mod: S$GLB,,, | Performed by: NURSE PRACTITIONER

## 2022-01-06 PROCEDURE — 3074F SYST BP LT 130 MM HG: CPT | Mod: CPTII,S$GLB,, | Performed by: NURSE PRACTITIONER

## 2022-01-06 PROCEDURE — 92567 TYMPANOMETRY: CPT | Mod: S$GLB,,, | Performed by: AUDIOLOGIST

## 2022-01-06 PROCEDURE — 3079F PR MOST RECENT DIASTOLIC BLOOD PRESSURE 80-89 MM HG: ICD-10-PCS | Mod: CPTII,S$GLB,, | Performed by: NURSE PRACTITIONER

## 2022-01-06 PROCEDURE — 3074F PR MOST RECENT SYSTOLIC BLOOD PRESSURE < 130 MM HG: ICD-10-PCS | Mod: CPTII,S$GLB,, | Performed by: NURSE PRACTITIONER

## 2022-01-06 PROCEDURE — 92557 PR COMPREHENSIVE HEARING TEST: ICD-10-PCS | Mod: S$GLB,,, | Performed by: AUDIOLOGIST

## 2022-01-06 NOTE — PROGRESS NOTES
Subjective:      Kamilah Romero is a 51 y.o. female who was self-referred for ear fullness.    She reports left ear fullness x 2 months.  She notes the sensation that there is something in the ear (like wax or water).  She denies any ear pain, ear drainage, hearing loss, or tinnitus.  She has tried mineral oil and hydrogen peroxide without benefit.  She denies any allergic symptoms- itchy, watery eyes, sneezing.  Denies recent URI infection.  Denies any right ear symptoms.  She reports undergoing a lower a facelift in July, but does not think this is related.  There is not a family history of hearing loss at a young age.  There is not a prior history of ear surgery.  There is not a prior history of ear infections.  There is not a history of ear trauma.  She does not wear hearing aids currently.  She has not had a hearing test recently.      Past Medical History  She has no past medical history on file.    Past Surgical History  She has a past surgical history that includes Breast biopsy (Right, 12/2020) and Breast biopsy (Right, 09/2020).    Family History  Her family history includes Breast cancer in her paternal grandmother; Cancer in her maternal grandfather.    Social History  She reports that she quit smoking about 3 years ago. She has never used smokeless tobacco. She reports current alcohol use of about 7.0 standard drinks of alcohol per week.    Allergies  She has No Known Allergies.    Medications  She has a current medication list which includes the following prescription(s): albuterol, escitalopram oxalate, methylprednisolone, and sumatriptan.    Review of Systems     Constitutional: Negative for appetite change, chills, fatigue, fever and unexpected weight loss.      HENT: Negative for ear discharge, ear infection, ear pain, facial swelling, hearing loss, mouth sores, nosebleeds, postnasal drip, ringing in the ears, runny nose, sinus infection, sinus pressure, sore throat, stuffy nose, tonsil  infection, dental problems, trouble swallowing and voice change.      Eyes:  Positive for eye drainage.     Respiratory:  Negative for cough, shortness of breath, sleep apnea, snoring and wheezing.      Cardiovascular:  Negative for chest pain, foot swelling, irregular heartbeat and swollen veins.     Gastrointestinal:  Negative for abdominal pain, acid reflux, constipation, diarrhea, heartburn and vomiting.     Genitourinary: Positive for frequent urination.     Musc: Negative for aching joints, aching muscles, back pain and neck pain.     Skin: Negative for rash.     Allergy: Negative for food allergies and seasonal allergies.     Endocrine: Negative for cold intolerance and heat intolerance.      Neurological: Negative for dizziness, headaches, light-headedness, seizures and tremors.      Hematologic: Negative for bruises/bleeds easily and swollen glands.      Psychiatric: Negative for decreased concentration, depression, nervous/anxious and sleep disturbance.          Objective:   /87   Pulse (!) 59      Constitutional:   She is oriented to person, place, and time. She appears well-developed and well-nourished. She appears alert. She is cooperative.  Non-toxic appearance. She does not have a sickly appearance. She does not appear ill. Normal speech.      Head:  Normocephalic and atraumatic. Not macrocephalic and not microcephalic. Head is without raccoon's eyes, without Cook's sign, without abrasion, without laceration, without right periorbital erythema, without left periorbital erythema and without TMJ tenderness.     Ears:    Right Ear: No lacerations. No drainage, swelling or tenderness. No foreign bodies. No mastoid tenderness. Tympanic membrane is not injected, not scarred, not perforated, not erythematous, not retracted and not bulging. No middle ear effusion. No hemotympanum.   Left Ear: No lacerations. No drainage, swelling or tenderness. No foreign bodies. No mastoid tenderness. Tympanic  membrane is not injected, not scarred, not perforated, not erythematous, not retracted and not bulging.  No middle ear effusion. No hemotympanum.   Significant ceruminous debris obstructing right TM removed under microscopy      Nose:  No mucosal edema, rhinorrhea, septal deviation or polyps. Turbinates normal and no turbinate hypertrophy.  Right sinus exhibits no maxillary sinus tenderness and no frontal sinus tenderness. Left sinus exhibits no maxillary sinus tenderness and no frontal sinus tenderness.     Psychiatric:   She has a normal mood and affect. Her speech is normal and behavior is normal.     Neurological:   She is alert and oriented to person, place, and time. Coordination and gait normal.     Procedure    Cerumen removal performed.  See procedure note.    Data Reviewed  No results found for: WBC  No results found for: EOSINOPHIL  No results found for: EOS  No results found for: PLT  No results found for: GLU  No results found for: IGE    Audiogram    I independently reviewed the tracings of the complete audiometric evaluation performed today.  I reviewed the audiogram with the patient as well.  Pertinent findings include a normal study.  Assessment:     1. Ear fullness, left    2. Normal hearing exam    3. Impacted cerumen of right ear      Plan:     I had a long discussion with the patient regarding her condition and the further workup and management options.      Ear fullness, left       -     Otoscopic exam benign with normal audiometric testing.  No evidence of TMJ.  Unclear etiology, reassurance provided.  Suggested a trial saline nasal spray or Flonase x 1 week.    Normal hearing exam        -     Reviewed patient's reported symptoms, physical exam and audiometric testing interpretation which are consistent with normal hearing bilaterally.         -     Hearing conservation strongly recommended when in noisy environments.          -     Patient encouraged to return to clinic every 2 years for  audiometric testing.    Impacted cerumen of right ear  -     Ear Cerumen Removal

## 2022-01-06 NOTE — PROGRESS NOTES
Kamilah Romero was seen today in the clinic for an audiologic evaluation.  Patient's main complaint was left ear fullness.  Ms. Romero reported that she felt as though there was fluid in her left ear.  She denied otalgia, tinnitus and vertigo.      Tympanometry revealed Type A in the right ear and Type A in the left ear. Audiogram results revealed normal hearing in the right ear and normal hearing in the left ear.  Speech reception thresholds were noted at 5 dB in the right ear and 10 dB in the left ear.  Speech discrimination scores were 100% in the right ear and 100% in the left ear.    Recommendations:  1. Otologic evaluation  2. Repeat audiogram as needed  3. Hearing protection when in noise

## 2022-01-06 NOTE — PROCEDURES
Ear Cerumen Removal    Date/Time: 1/6/2022 11:30 AM  Performed by: Kim Bradley NP  Authorized by: Kim Bradley NP       Local anesthetic:  None  Location details:  Right ear  Procedure type: curette    Cerumen  Removal Results:  Cerumen completely removed  Patient tolerance:  Patient tolerated the procedure well with no immediate complications     Procedure Note:    The patient was brought to the minor procedure room and placed under the operating microscope of the right ear canal which was cleaned of ceruminous debris. Using a combination of suction, curettes and cup forceps the patient's cerumen impaction was removed. The tympanic membrane was evaluated and was unremarkable. The patient tolerated the procedure well. There were no complications.

## 2022-05-03 ENCOUNTER — PATIENT MESSAGE (OUTPATIENT)
Dept: RESEARCH | Facility: HOSPITAL | Age: 52
End: 2022-05-03
Payer: COMMERCIAL

## 2022-08-16 ENCOUNTER — OFFICE VISIT (OUTPATIENT)
Dept: SURGERY | Facility: CLINIC | Age: 52
End: 2022-08-16
Payer: COMMERCIAL

## 2022-08-16 VITALS
BODY MASS INDEX: 24.87 KG/M2 | WEIGHT: 149.25 LBS | HEIGHT: 65 IN | DIASTOLIC BLOOD PRESSURE: 80 MMHG | SYSTOLIC BLOOD PRESSURE: 117 MMHG

## 2022-08-16 DIAGNOSIS — K64.9 HEMORRHOIDS, UNSPECIFIED HEMORRHOID TYPE: Primary | ICD-10-CM

## 2022-08-16 PROCEDURE — 3074F SYST BP LT 130 MM HG: CPT | Mod: CPTII,S$GLB,, | Performed by: NURSE PRACTITIONER

## 2022-08-16 PROCEDURE — 3074F PR MOST RECENT SYSTOLIC BLOOD PRESSURE < 130 MM HG: ICD-10-PCS | Mod: CPTII,S$GLB,, | Performed by: NURSE PRACTITIONER

## 2022-08-16 PROCEDURE — 99214 PR OFFICE/OUTPT VISIT, EST, LEVL IV, 30-39 MIN: ICD-10-PCS | Mod: 25,S$GLB,, | Performed by: NURSE PRACTITIONER

## 2022-08-16 PROCEDURE — 3079F PR MOST RECENT DIASTOLIC BLOOD PRESSURE 80-89 MM HG: ICD-10-PCS | Mod: CPTII,S$GLB,, | Performed by: NURSE PRACTITIONER

## 2022-08-16 PROCEDURE — 46600 PR DIAG2STIC A2SCOPY: ICD-10-PCS | Mod: S$GLB,,, | Performed by: NURSE PRACTITIONER

## 2022-08-16 PROCEDURE — 3008F BODY MASS INDEX DOCD: CPT | Mod: CPTII,S$GLB,, | Performed by: NURSE PRACTITIONER

## 2022-08-16 PROCEDURE — 1159F PR MEDICATION LIST DOCUMENTED IN MEDICAL RECORD: ICD-10-PCS | Mod: CPTII,S$GLB,, | Performed by: NURSE PRACTITIONER

## 2022-08-16 PROCEDURE — 99999 PR PBB SHADOW E&M-EST. PATIENT-LVL III: CPT | Mod: PBBFAC,,, | Performed by: NURSE PRACTITIONER

## 2022-08-16 PROCEDURE — 46600 DIAGNOSTIC ANOSCOPY SPX: CPT | Mod: S$GLB,,, | Performed by: NURSE PRACTITIONER

## 2022-08-16 PROCEDURE — 99999 PR PBB SHADOW E&M-EST. PATIENT-LVL III: ICD-10-PCS | Mod: PBBFAC,,, | Performed by: NURSE PRACTITIONER

## 2022-08-16 PROCEDURE — 1160F RVW MEDS BY RX/DR IN RCRD: CPT | Mod: CPTII,S$GLB,, | Performed by: NURSE PRACTITIONER

## 2022-08-16 PROCEDURE — 1159F MED LIST DOCD IN RCRD: CPT | Mod: CPTII,S$GLB,, | Performed by: NURSE PRACTITIONER

## 2022-08-16 PROCEDURE — 3079F DIAST BP 80-89 MM HG: CPT | Mod: CPTII,S$GLB,, | Performed by: NURSE PRACTITIONER

## 2022-08-16 PROCEDURE — 3008F PR BODY MASS INDEX (BMI) DOCUMENTED: ICD-10-PCS | Mod: CPTII,S$GLB,, | Performed by: NURSE PRACTITIONER

## 2022-08-16 PROCEDURE — 1160F PR REVIEW ALL MEDS BY PRESCRIBER/CLIN PHARMACIST DOCUMENTED: ICD-10-PCS | Mod: CPTII,S$GLB,, | Performed by: NURSE PRACTITIONER

## 2022-08-16 PROCEDURE — 99214 OFFICE O/P EST MOD 30 MIN: CPT | Mod: 25,S$GLB,, | Performed by: NURSE PRACTITIONER

## 2022-08-16 RX ORDER — HYDROCORTISONE 25 MG/G
CREAM TOPICAL 2 TIMES DAILY
Qty: 28 G | Refills: 1 | Status: SHIPPED | OUTPATIENT
Start: 2022-08-16 | End: 2023-02-16

## 2022-08-16 NOTE — PROGRESS NOTES
"CRS Office Visit History and Physical    Referring Md:   Yuriy Dixon Md  [address] [place] [address]    SUBJECTIVE:     Chief Complaint: rectal pain    History of Present Illness:  The patient is new patient to this practice.   Course is as follows:  Patient is a 52 y.o. female presents with rectal pain. She states she had a Child 12 years ago and that when she noticed  Hemorrhoids.   Things have been fine until recently she felt that her hemorrhoids protruded twice. No pain w a BM. No profuse bleeding.   Did not scant BRB on TP with wiping. One BM per day.Stool described as formed, log shaped. Very rarely takes a fiber supplement.     Last Colonoscopy: 3 years ago reported as normal.   No FH of crohns or Colon CA    Review of patient's allergies indicates:  No Known Allergies    No past medical history on file.  Past Surgical History:   Procedure Laterality Date    BREAST BIOPSY Right 12/2020    BREAST BIOPSY Right 09/2020     Family History   Problem Relation Age of Onset    Breast cancer Paternal Grandmother         unk age of onset    Cancer Maternal Grandfather         stomach ca (possibly)    Ovarian cancer Neg Hx      Social History     Tobacco Use    Smoking status: Former Smoker     Quit date: 10/29/2018     Years since quitting: 3.8    Smokeless tobacco: Never Used   Substance Use Topics    Alcohol use: Yes     Alcohol/week: 7.0 standard drinks     Types: 7 Glasses of wine per week        Review of Systems:  Review of Systems   Gastrointestinal:        Hemorrhoid prolapse and irritation       OBJECTIVE:     Vital Signs (Most Recent)  Blood Pressure 117/80 (BP Location: Right arm, Patient Position: Sitting, BP Method: Large (Automatic))   Height 5' 4.5" (1.638 m)   Weight 67.7 kg (149 lb 4 oz)   Body Mass Index 25.22 kg/m²     Physical Exam:  General: White female in no distress   Neuro: Alert and oriented to person, place, and time.  Moves all extremities.     HEENT: No " icterus.  Trachea midline  Respiratory: Respirations are even and unlabored, no cough or audible wheezing  Skin: Warm dry and intact, No visible rashes, no jaundice    Labs reviewed today:  No results found for: WBC, HGB, HCT, PLT, CHOL, TRIG, HDL, LDLDIRECT, ALT, AST, NA, K, CL, CREATININE, BUN, CO2, TSH, PSA, INR, GLUF, HGBA1C, MICROALBUR    Imaging reviewed today:  none    Endoscopy reviewed today:  none    Anorectal Exam:    Anal Skin: Normal    Digital Rectal Exam:  Resting Tone normal  Mass none  Rectocele  absent  Tenderness  absent    Anoscopy:  Verbal consent was obtained.   Clear plastic anoscope inserted.    Hemorrhoids  present  Stigmata of bleeding  present  Stigmata of prolapsed  absent  Distal rectal mucosa normal        ASSESSMENT/PLAN:     Diagnoses and all orders for this visit:    Hemorrhoids, unspecified hemorrhoid type  -     hydrocortisone (ANUSOL-HC) 2.5 % rectal cream; Place rectally 2 (two) times daily.         52 y.o. F here today for recent episode of rectal pain and scant bleeding. Anoscopy showed internal hemorrhoids. Reports she had normal colonoscopy  3 years ago.    The patient was instructed to:  Use anusol rectal cream up to BID if hemorrhoids flare in the future.  Increase water intake to at least 8-10 glasses of water per day.  Take a daily fiber supplement (Konsyl, Benefiber, Metamucil) and increase dietary intake to 20-30 grams/day.  Avoid straining or spending >5min/event with bowel movements.    Follow-up in clinic PRN      BRIELLE Johansen  Colon and Rectal Surgery

## 2022-08-16 NOTE — PATIENT INSTRUCTIONS
Start fiber supplement such as Fibercon (tablet), Citrucel or Metamucil every day, increase as tolerated per  instructions to achieve one to three well formed and easy to pass stools per 7 day period  Water intake of 64 oz per day  May use prescribed cortisone cream to hemorrhoids twice a day not to exceed more than 2 weeks at a time. Take 2 week medication vacation holiday then resume as needed.  Warm sitz baths as needed  Do not sit on the toilet for more than 5 mins at a time

## 2022-09-23 ENCOUNTER — LAB VISIT (OUTPATIENT)
Dept: LAB | Facility: OTHER | Age: 52
End: 2022-09-23
Attending: OBSTETRICS & GYNECOLOGY
Payer: COMMERCIAL

## 2022-09-23 ENCOUNTER — OFFICE VISIT (OUTPATIENT)
Dept: OBSTETRICS AND GYNECOLOGY | Facility: CLINIC | Age: 52
End: 2022-09-23
Attending: OBSTETRICS & GYNECOLOGY
Payer: COMMERCIAL

## 2022-09-23 VITALS
DIASTOLIC BLOOD PRESSURE: 92 MMHG | HEIGHT: 65 IN | SYSTOLIC BLOOD PRESSURE: 121 MMHG | WEIGHT: 146.81 LBS | BODY MASS INDEX: 24.46 KG/M2

## 2022-09-23 DIAGNOSIS — N39.3 SUI (STRESS URINARY INCONTINENCE, FEMALE): ICD-10-CM

## 2022-09-23 DIAGNOSIS — Z12.4 CERVICAL CANCER SCREENING: ICD-10-CM

## 2022-09-23 DIAGNOSIS — Z01.419 WELL WOMAN EXAM WITH ROUTINE GYNECOLOGICAL EXAM: Primary | ICD-10-CM

## 2022-09-23 DIAGNOSIS — Z12.39 SCREENING BREAST EXAMINATION: ICD-10-CM

## 2022-09-23 DIAGNOSIS — Z01.419 WELL WOMAN EXAM WITH ROUTINE GYNECOLOGICAL EXAM: ICD-10-CM

## 2022-09-23 LAB
25(OH)D3+25(OH)D2 SERPL-MCNC: 67 NG/ML (ref 30–96)
ALBUMIN SERPL BCP-MCNC: 4 G/DL (ref 3.5–5.2)
ALP SERPL-CCNC: 30 U/L (ref 55–135)
ALT SERPL W/O P-5'-P-CCNC: 15 U/L (ref 10–44)
ANION GAP SERPL CALC-SCNC: 8 MMOL/L (ref 8–16)
AST SERPL-CCNC: 16 U/L (ref 10–40)
BASOPHILS # BLD AUTO: 0.02 K/UL (ref 0–0.2)
BASOPHILS NFR BLD: 0.5 % (ref 0–1.9)
BILIRUB SERPL-MCNC: 0.5 MG/DL (ref 0.1–1)
BUN SERPL-MCNC: 10 MG/DL (ref 6–20)
CALCIUM SERPL-MCNC: 9.6 MG/DL (ref 8.7–10.5)
CHLORIDE SERPL-SCNC: 104 MMOL/L (ref 95–110)
CO2 SERPL-SCNC: 26 MMOL/L (ref 23–29)
CREAT SERPL-MCNC: 0.7 MG/DL (ref 0.5–1.4)
DIFFERENTIAL METHOD: ABNORMAL
EOSINOPHIL # BLD AUTO: 0.1 K/UL (ref 0–0.5)
EOSINOPHIL NFR BLD: 1.2 % (ref 0–8)
ERYTHROCYTE [DISTWIDTH] IN BLOOD BY AUTOMATED COUNT: 11.9 % (ref 11.5–14.5)
EST. GFR  (NO RACE VARIABLE): >60 ML/MIN/1.73 M^2
ESTIMATED AVG GLUCOSE: 100 MG/DL (ref 68–131)
FERRITIN SERPL-MCNC: 67 NG/ML (ref 20–300)
FSH SERPL-ACNC: 15.08 MIU/ML
GLUCOSE SERPL-MCNC: 95 MG/DL (ref 70–110)
HBA1C MFR BLD: 5.1 % (ref 4–5.6)
HCT VFR BLD AUTO: 42.9 % (ref 37–48.5)
HGB BLD-MCNC: 14.6 G/DL (ref 12–16)
IMM GRANULOCYTES # BLD AUTO: 0.02 K/UL (ref 0–0.04)
IMM GRANULOCYTES NFR BLD AUTO: 0.5 % (ref 0–0.5)
LYMPHOCYTES # BLD AUTO: 1.5 K/UL (ref 1–4.8)
LYMPHOCYTES NFR BLD: 34.9 % (ref 18–48)
MAGNESIUM SERPL-MCNC: 1.9 MG/DL (ref 1.6–2.6)
MCH RBC QN AUTO: 33 PG (ref 27–31)
MCHC RBC AUTO-ENTMCNC: 34 G/DL (ref 32–36)
MCV RBC AUTO: 97 FL (ref 82–98)
MONOCYTES # BLD AUTO: 0.3 K/UL (ref 0.3–1)
MONOCYTES NFR BLD: 6 % (ref 4–15)
NEUTROPHILS # BLD AUTO: 2.5 K/UL (ref 1.8–7.7)
NEUTROPHILS NFR BLD: 56.9 % (ref 38–73)
NRBC BLD-RTO: 0 /100 WBC
PLATELET # BLD AUTO: 223 K/UL (ref 150–450)
PMV BLD AUTO: 11.9 FL (ref 9.2–12.9)
POTASSIUM SERPL-SCNC: 4 MMOL/L (ref 3.5–5.1)
PROT SERPL-MCNC: 6.8 G/DL (ref 6–8.4)
RBC # BLD AUTO: 4.42 M/UL (ref 4–5.4)
SODIUM SERPL-SCNC: 138 MMOL/L (ref 136–145)
T4 FREE SERPL-MCNC: 0.73 NG/DL (ref 0.71–1.51)
TSH SERPL DL<=0.005 MIU/L-ACNC: 1.08 UIU/ML (ref 0.4–4)
WBC # BLD AUTO: 4.3 K/UL (ref 3.9–12.7)

## 2022-09-23 PROCEDURE — 88175 CYTOPATH C/V AUTO FLUID REDO: CPT | Performed by: OBSTETRICS & GYNECOLOGY

## 2022-09-23 PROCEDURE — 87624 HPV HI-RISK TYP POOLED RSLT: CPT | Performed by: OBSTETRICS & GYNECOLOGY

## 2022-09-23 PROCEDURE — 99386 PREV VISIT NEW AGE 40-64: CPT | Mod: S$GLB,,, | Performed by: OBSTETRICS & GYNECOLOGY

## 2022-09-23 PROCEDURE — 3080F DIAST BP >= 90 MM HG: CPT | Mod: CPTII,S$GLB,, | Performed by: OBSTETRICS & GYNECOLOGY

## 2022-09-23 PROCEDURE — 82306 VITAMIN D 25 HYDROXY: CPT | Performed by: OBSTETRICS & GYNECOLOGY

## 2022-09-23 PROCEDURE — 3074F SYST BP LT 130 MM HG: CPT | Mod: CPTII,S$GLB,, | Performed by: OBSTETRICS & GYNECOLOGY

## 2022-09-23 PROCEDURE — 99999 PR PBB SHADOW E&M-EST. PATIENT-LVL IV: ICD-10-PCS | Mod: PBBFAC,,, | Performed by: OBSTETRICS & GYNECOLOGY

## 2022-09-23 PROCEDURE — 1159F MED LIST DOCD IN RCRD: CPT | Mod: CPTII,S$GLB,, | Performed by: OBSTETRICS & GYNECOLOGY

## 2022-09-23 PROCEDURE — 3044F PR MOST RECENT HEMOGLOBIN A1C LEVEL <7.0%: ICD-10-PCS | Mod: CPTII,S$GLB,, | Performed by: OBSTETRICS & GYNECOLOGY

## 2022-09-23 PROCEDURE — 80053 COMPREHEN METABOLIC PANEL: CPT | Performed by: OBSTETRICS & GYNECOLOGY

## 2022-09-23 PROCEDURE — 84443 ASSAY THYROID STIM HORMONE: CPT | Performed by: OBSTETRICS & GYNECOLOGY

## 2022-09-23 PROCEDURE — 1159F PR MEDICATION LIST DOCUMENTED IN MEDICAL RECORD: ICD-10-PCS | Mod: CPTII,S$GLB,, | Performed by: OBSTETRICS & GYNECOLOGY

## 2022-09-23 PROCEDURE — 99999 PR PBB SHADOW E&M-EST. PATIENT-LVL IV: CPT | Mod: PBBFAC,,, | Performed by: OBSTETRICS & GYNECOLOGY

## 2022-09-23 PROCEDURE — 3074F PR MOST RECENT SYSTOLIC BLOOD PRESSURE < 130 MM HG: ICD-10-PCS | Mod: CPTII,S$GLB,, | Performed by: OBSTETRICS & GYNECOLOGY

## 2022-09-23 PROCEDURE — 83036 HEMOGLOBIN GLYCOSYLATED A1C: CPT | Performed by: OBSTETRICS & GYNECOLOGY

## 2022-09-23 PROCEDURE — 83735 ASSAY OF MAGNESIUM: CPT | Performed by: OBSTETRICS & GYNECOLOGY

## 2022-09-23 PROCEDURE — 3080F PR MOST RECENT DIASTOLIC BLOOD PRESSURE >= 90 MM HG: ICD-10-PCS | Mod: CPTII,S$GLB,, | Performed by: OBSTETRICS & GYNECOLOGY

## 2022-09-23 PROCEDURE — 82728 ASSAY OF FERRITIN: CPT | Performed by: OBSTETRICS & GYNECOLOGY

## 2022-09-23 PROCEDURE — 36415 COLL VENOUS BLD VENIPUNCTURE: CPT | Performed by: OBSTETRICS & GYNECOLOGY

## 2022-09-23 PROCEDURE — 83001 ASSAY OF GONADOTROPIN (FSH): CPT | Performed by: OBSTETRICS & GYNECOLOGY

## 2022-09-23 PROCEDURE — 85025 COMPLETE CBC W/AUTO DIFF WBC: CPT | Performed by: OBSTETRICS & GYNECOLOGY

## 2022-09-23 PROCEDURE — 3044F HG A1C LEVEL LT 7.0%: CPT | Mod: CPTII,S$GLB,, | Performed by: OBSTETRICS & GYNECOLOGY

## 2022-09-23 PROCEDURE — 84439 ASSAY OF FREE THYROXINE: CPT | Performed by: OBSTETRICS & GYNECOLOGY

## 2022-09-23 PROCEDURE — 99386 PR PREVENTIVE VISIT,NEW,40-64: ICD-10-PCS | Mod: S$GLB,,, | Performed by: OBSTETRICS & GYNECOLOGY

## 2022-09-23 RX ORDER — PHENOBARBITAL 64.8 MG/1
TABLET ORAL
COMMUNITY
Start: 2022-08-03 | End: 2023-02-16

## 2022-09-23 RX ORDER — DIAZEPAM 10 MG/1
10 TABLET ORAL DAILY PRN
COMMUNITY
Start: 2022-07-07 | End: 2023-02-16

## 2022-09-23 RX ORDER — ESCITALOPRAM OXALATE 20 MG/1
20 TABLET ORAL DAILY
Qty: 90 TABLET | Refills: 3 | Status: SHIPPED | OUTPATIENT
Start: 2022-09-23 | End: 2023-10-02

## 2022-09-23 NOTE — PROGRESS NOTES
"CC: Well woman exam    Kamilah Romero is a 52 y.o. female  presents for well woman exam.  LMP: Patient's last menstrual period was 09/10/2022..  transfer of care from Dr. Garcia who retired. Periods are still occurring, seem to be further apart. No hot flashes or night sweats.  No abnormal pap smears.  Does report some CELE.    Past Medical History:   Diagnosis Date    Depression     stable     Past Surgical History:   Procedure Laterality Date    BREAST BIOPSY Right 2020    BREAST BIOPSY Right 2020    VAGINAL DELIVERY       Social History     Socioeconomic History    Marital status:    Tobacco Use    Smoking status: Former     Types: Cigarettes     Quit date: 10/29/2018     Years since quitting: 3.9    Smokeless tobacco: Never   Substance and Sexual Activity    Alcohol use: Yes     Alcohol/week: 7.0 standard drinks     Types: 7 Glasses of wine per week    Drug use: Never    Sexual activity: Yes     Partners: Male     Family History   Problem Relation Age of Onset    Breast cancer Paternal Grandmother         unk age of onset    Cancer Maternal Grandfather         stomach ca (possibly)    Ovarian cancer Neg Hx      OB History          1    Para   1    Term   1            AB        Living             SAB        IAB        Ectopic        Multiple        Live Births                     BP (!) 121/92 (BP Location: Right arm, Patient Position: Sitting)   Ht 5' 4.5" (1.638 m)   Wt 66.6 kg (146 lb 13.2 oz)   LMP 09/10/2022 Comment: 22  BMI 24.81 kg/m²       ROS:  GENERAL: Denies weight gain or weight loss. Feeling well overall.   SKIN: Denies rash or lesions.   HEAD: Denies head injury or headache.   NODES: Denies enlarged lymph nodes.   CHEST: Denies chest pain or shortness of breath.   CARDIOVASCULAR: Denies palpitations or left sided chest pain.   ABDOMEN: No abdominal pain, constipation, diarrhea, nausea, vomiting or rectal bleeding.   URINARY: No frequency, dysuria, " hematuria, or burning on urination.  REPRODUCTIVE: See HPI.   BREASTS: The patient performs breast self-examination and denies pain, lumps, or nipple discharge.   HEMATOLOGIC: No easy bruisability or excessive bleeding.   MUSCULOSKELETAL: Denies joint pain or swelling.   NEUROLOGIC: Denies syncope or weakness.   PSYCHIATRIC: Denies depression, anxiety or mood swings.    PHYSICAL EXAM:  APPEARANCE: Well nourished, well developed, in no acute distress.  AFFECT: WNL, alert and oriented x 3  SKIN: No acne or hirsutism  NECK: Neck symmetric without masses or thyromegaly  NODES: No inguinal, cervical, axillary, or femoral lymph node enlargement  CHEST: Good respiratory effect  ABDOMEN: Soft.  No tenderness or masses.  No hepatosplenomegaly.  No hernias.  BREASTS: Symmetrical, no skin changes or visible lesions.  No palpable masses, nipple discharge bilaterally.  PELVIC: Normal external genitalia without lesions.  Normal hair distribution.  Adequate perineal body, normal urethral meatus.  Vagina moist and well rugated without lesions or discharge.  Cervix pink, without lesions, discharge or tenderness.  No significant cystocele or rectocele.  Bimanual exam shows uterus to be normal size, regular, mobile and nontender.  Adnexa without masses or tenderness.    EXTREMITIES: No edema.    Well woman exam with routine gynecological exam  -     CBC Auto Differential; Future; Expected date: 09/23/2022  -     Comprehensive Metabolic Panel; Future; Expected date: 09/23/2022  -     Hemoglobin A1C; Future; Expected date: 09/23/2022  -     TSH; Future; Expected date: 09/23/2022  -     T4, Free; Future; Expected date: 09/23/2022  -     Vitamin D; Future; Expected date: 09/23/2022  -     FERRITIN; Future; Expected date: 09/23/2022  -     Follicle Stimulating Hormone; Future; Expected date: 09/23/2022  -     Magnesium; Future; Expected date: 09/23/2022  -     EScitalopram oxalate (LEXAPRO) 20 MG tablet; Take 1 tablet (20 mg total) by  mouth once daily.  Dispense: 90 tablet; Refill: 3    Screening breast examination  -     Mammo Digital Screening Bilat w/ Willie; Future; Expected date: 09/23/2022    CELE (stress urinary incontinence, female)  -     Ambulatory referral/consult to Physical/Occupational Therapy; Future; Expected date: 09/30/2022          Patient was counseled today on A.C.S. Pap guidelines and recommendations for yearly pelvic exams, mammograms and monthly self breast exams; to see her PCP for other health maintenance.     No follow-ups on file.

## 2022-09-25 ENCOUNTER — PATIENT MESSAGE (OUTPATIENT)
Dept: OBSTETRICS AND GYNECOLOGY | Facility: CLINIC | Age: 52
End: 2022-09-25
Payer: COMMERCIAL

## 2022-09-28 LAB
FINAL PATHOLOGIC DIAGNOSIS: NORMAL
HPV HR 12 DNA SPEC QL NAA+PROBE: NEGATIVE
HPV16 AG SPEC QL: NEGATIVE
HPV18 DNA SPEC QL NAA+PROBE: NEGATIVE
Lab: NORMAL

## 2022-11-17 ENCOUNTER — CLINICAL SUPPORT (OUTPATIENT)
Dept: REHABILITATION | Facility: OTHER | Age: 52
End: 2022-11-17
Attending: OBSTETRICS & GYNECOLOGY
Payer: COMMERCIAL

## 2022-11-17 DIAGNOSIS — R27.9 LACK OF COORDINATION: ICD-10-CM

## 2022-11-17 DIAGNOSIS — N39.3 SUI (STRESS URINARY INCONTINENCE, FEMALE): ICD-10-CM

## 2022-11-17 PROCEDURE — 97112 NEUROMUSCULAR REEDUCATION: CPT

## 2022-11-17 PROCEDURE — 97161 PT EVAL LOW COMPLEX 20 MIN: CPT

## 2022-11-17 PROCEDURE — 97530 THERAPEUTIC ACTIVITIES: CPT
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
## 2022-11-18 ENCOUNTER — HOSPITAL ENCOUNTER (OUTPATIENT)
Dept: RADIOLOGY | Facility: OTHER | Age: 52
Discharge: HOME | End: 2022-11-18
Attending: OBSTETRICS & GYNECOLOGY
Payer: COMMERCIAL

## 2022-11-18 DIAGNOSIS — Z12.39 SCREENING BREAST EXAMINATION: ICD-10-CM

## 2022-11-18 DIAGNOSIS — Z12.31 BREAST CANCER SCREENING BY MAMMOGRAM: ICD-10-CM

## 2022-11-18 PROCEDURE — 77063 BREAST TOMOSYNTHESIS BI: CPT | Mod: TC

## 2022-11-18 PROCEDURE — 77067 MAMMO DIGITAL SCREENING BILAT WITH TOMO: ICD-10-PCS | Mod: 26,,, | Performed by: RADIOLOGY

## 2022-11-18 PROCEDURE — 77067 SCR MAMMO BI INCL CAD: CPT | Mod: TC

## 2022-11-18 PROCEDURE — 77063 BREAST TOMOSYNTHESIS BI: CPT | Mod: 26,,, | Performed by: RADIOLOGY

## 2022-11-18 PROCEDURE — 77063 MAMMO DIGITAL SCREENING BILAT WITH TOMO: ICD-10-PCS | Mod: 26,,, | Performed by: RADIOLOGY

## 2022-11-18 PROCEDURE — 77067 SCR MAMMO BI INCL CAD: CPT | Mod: 26,,, | Performed by: RADIOLOGY

## 2023-02-13 ENCOUNTER — TELEPHONE (OUTPATIENT)
Dept: PRIMARY CARE CLINIC | Facility: CLINIC | Age: 53
End: 2023-02-13
Payer: COMMERCIAL

## 2023-02-13 NOTE — TELEPHONE ENCOUNTER
Pt scheduled 02/16 @ 11 am. Tried to call pt, Called pt, no answer-no voicemail. Will try again later

## 2023-02-13 NOTE — TELEPHONE ENCOUNTER
----- Message from Chrissy Ladd sent at 2/13/2023 12:46 PM CST -----  Contact: pt, [phone]  New patient states she's tried repeatedly to schedule with you on the 11:30 am slot on 2/16. Patient requests a call back today to confirm her appointment. Please advise, provider's schedule does not populate in Epic.

## 2023-02-16 ENCOUNTER — OFFICE VISIT (OUTPATIENT)
Dept: PRIMARY CARE CLINIC | Facility: CLINIC | Age: 53
End: 2023-02-16
Payer: COMMERCIAL

## 2023-02-16 VITALS
HEIGHT: 64 IN | DIASTOLIC BLOOD PRESSURE: 78 MMHG | SYSTOLIC BLOOD PRESSURE: 118 MMHG | BODY MASS INDEX: 24.46 KG/M2 | WEIGHT: 143.31 LBS | HEART RATE: 63 BPM | RESPIRATION RATE: 16 BRPM | OXYGEN SATURATION: 97 %

## 2023-02-16 DIAGNOSIS — F32.A ANXIETY AND DEPRESSION: ICD-10-CM

## 2023-02-16 DIAGNOSIS — Z00.00 PREVENTATIVE HEALTH CARE: ICD-10-CM

## 2023-02-16 DIAGNOSIS — R42 DIZZINESS: Primary | ICD-10-CM

## 2023-02-16 DIAGNOSIS — F41.9 ANXIETY AND DEPRESSION: ICD-10-CM

## 2023-02-16 PROBLEM — U09.9 COVID-19 LONG HAULER MANIFESTING CHRONIC NEUROLOGIC SYMPTOMS: Status: RESOLVED | Noted: 2023-02-16 | Resolved: 2023-02-16

## 2023-02-16 PROBLEM — R27.9 LACK OF COORDINATION: Status: RESOLVED | Noted: 2022-11-17 | Resolved: 2023-02-16

## 2023-02-16 PROBLEM — H83.03: Status: ACTIVE | Noted: 2023-02-16

## 2023-02-16 PROBLEM — R29.90 COVID-19 LONG HAULER MANIFESTING CHRONIC NEUROLOGIC SYMPTOMS: Status: RESOLVED | Noted: 2023-02-16 | Resolved: 2023-02-16

## 2023-02-16 PROBLEM — U09.9 COVID-19 LONG HAULER MANIFESTING CHRONIC NEUROLOGIC SYMPTOMS: Status: ACTIVE | Noted: 2023-02-16

## 2023-02-16 PROBLEM — R29.90 COVID-19 LONG HAULER MANIFESTING CHRONIC NEUROLOGIC SYMPTOMS: Status: ACTIVE | Noted: 2023-02-16

## 2023-02-16 PROBLEM — G43.109 MIGRAINE WITH AURA AND WITHOUT STATUS MIGRAINOSUS, NOT INTRACTABLE: Status: RESOLVED | Noted: 2019-07-01 | Resolved: 2023-02-16

## 2023-02-16 PROCEDURE — 99215 OFFICE O/P EST HI 40 MIN: CPT | Mod: 25,S$GLB,, | Performed by: INTERNAL MEDICINE

## 2023-02-16 PROCEDURE — 99215 PR OFFICE/OUTPT VISIT, EST, LEVL V, 40-54 MIN: ICD-10-PCS | Mod: 25,S$GLB,, | Performed by: INTERNAL MEDICINE

## 2023-02-16 PROCEDURE — 3078F PR MOST RECENT DIASTOLIC BLOOD PRESSURE < 80 MM HG: ICD-10-PCS | Mod: CPTII,S$GLB,, | Performed by: INTERNAL MEDICINE

## 2023-02-16 PROCEDURE — 3074F PR MOST RECENT SYSTOLIC BLOOD PRESSURE < 130 MM HG: ICD-10-PCS | Mod: CPTII,S$GLB,, | Performed by: INTERNAL MEDICINE

## 2023-02-16 PROCEDURE — 3008F PR BODY MASS INDEX (BMI) DOCUMENTED: ICD-10-PCS | Mod: CPTII,S$GLB,, | Performed by: INTERNAL MEDICINE

## 2023-02-16 PROCEDURE — 99999 PR PBB SHADOW E&M-EST. PATIENT-LVL III: ICD-10-PCS | Mod: PBBFAC,,, | Performed by: INTERNAL MEDICINE

## 2023-02-16 PROCEDURE — 3074F SYST BP LT 130 MM HG: CPT | Mod: CPTII,S$GLB,, | Performed by: INTERNAL MEDICINE

## 2023-02-16 PROCEDURE — 3008F BODY MASS INDEX DOCD: CPT | Mod: CPTII,S$GLB,, | Performed by: INTERNAL MEDICINE

## 2023-02-16 PROCEDURE — 99999 PR PBB SHADOW E&M-EST. PATIENT-LVL III: CPT | Mod: PBBFAC,,, | Performed by: INTERNAL MEDICINE

## 2023-02-16 PROCEDURE — 3078F DIAST BP <80 MM HG: CPT | Mod: CPTII,S$GLB,, | Performed by: INTERNAL MEDICINE

## 2023-02-16 RX ORDER — MECLIZINE HCL 12.5 MG 12.5 MG/1
12.5 TABLET ORAL 3 TIMES DAILY PRN
Qty: 30 TABLET | Refills: 3 | Status: SHIPPED | OUTPATIENT
Start: 2023-02-16 | End: 2023-12-07 | Stop reason: CLARIF

## 2023-02-16 NOTE — ASSESSMENT & PLAN NOTE
-possibly post-COVID sequelae  Celestone 6mg IM  -start Antivert 12.5 mg tid prn dizziness   Call Dr. Tucker if not improved

## 2023-02-16 NOTE — PROGRESS NOTES
[facility] Progress Note      Chief Complaint     Establish Care      Subjective:      History of Present Illness:  Kamilah Romero is a 52 y.o. female here to establish care - labs drawn 9/2022 reviewed - all WNL    COVID #2: had very mild URI symptoms without fever that lasted approx 10 days on Jan 16, 2023 followed by chronic dizziness, brain fog, and light-headedness; no blurry vision; had full eye exam WNL; denies anosmia, poor concentration, appetite change, tinnitus, hearing loss, vertigo, N/V, headache; does not prevent pt from sleeping, no nasal discharge or stuffiness, no facial pain. Not improved with Epley maneuver, does not prevent pt from exercising or performing ADLs.  -denies head trauma    Depression/Anxiety:  well-controlled on lexapro 20mg daily; sees therapist qowk    Menses: usually regular and monthly     SH: social alcohol qod;  with one son;  works as Molecular Sensing teacher on [employer]   Dad alive 88 y/o; mom HTN, tobacco 83 y/o     Past Medical History:  Past Medical History:   Diagnosis Date    COVID-19 long hauler manifesting chronic neurologic symptoms 2/16/2023    Depression     stable    Lack of coordination 11/17/2022    Migraine with aura and without status migrainosus, not intractable 7/1/2019    Plan for Medrol for status migrainosus Sumatriptan 50mg prn Has tried Maxalt, Tylenol       Past Surgical History:  Past Surgical History:   Procedure Laterality Date    BREAST BIOPSY Right 12/2020    BREAST BIOPSY Right 09/2020    VAGINAL DELIVERY         Allergies:  Review of patient's allergies indicates:  No Known Allergies    Home Medications:  Current Outpatient Medications   Medication Sig Dispense Refill    EScitalopram oxalate (LEXAPRO) 20 MG tablet Take 1 tablet (20 mg total) by mouth once daily. 90 tablet 3    meclizine (ANTIVERT) 12.5 mg tablet Take 1 tablet (12.5 mg total) by mouth 3 (three) times daily as needed for Dizziness. 30 tablet 3  "    No current facility-administered medications for this visit.        Family History:  Family History   Problem Relation Age of Onset    Breast cancer Paternal Grandmother         unk age of onset    Cancer Maternal Grandfather         stomach ca (possibly)    Ovarian cancer Neg Hx        Social History:  Social History     Tobacco Use    Smoking status: Former     Types: Cigarettes     Quit date: 10/29/2018     Years since quittin.3    Smokeless tobacco: Never   Substance Use Topics    Alcohol use: Yes     Alcohol/week: 7.0 standard drinks     Types: 7 Glasses of wine per week    Drug use: Never       Review of Systems:  Pertinent positives and negatives listed in HPI. All other systems are reviewed and are negative.    Health Maintaince :   Health Maintenance Topics with due status: Not Due       Topic Last Completion Date    Colorectal Cancer Screening 05/15/2019    Cervical Cancer Screening 2022    Mammogram 2022           Eye:  WNL   Dental: UTD  Hearing Eval: normal 2022    Immunizations:   Tdap: n/a.  Influenza: not.  Pneumovax: n/a  Shingrex x 2:  needs  COVID: x 2; refuses booster  Hepatitis C:   Cancer Screening:  PAP: UTD 2022 neg HPV and cytology   Mammogram: UTD 2022  Colonoscopy: 2019 repeat 2029      The ASCVD Risk score (Lisandro NELSON, et al., 2019) failed to calculate for the following reasons:    Cannot find a previous HDL lab    Cannot find a previous total cholesterol lab      Objective:   /78 (BP Location: Right arm, Patient Position: Sitting, BP Method: Medium (Manual))   Pulse 63   Resp 16   Ht 5' 4" (1.626 m)   Wt 65 kg (143 lb 4.8 oz)   SpO2 97%   BMI 24.60 kg/m²      Body mass index is 24.6 kg/m².       Physical Examination:  General: Alert and awake in no apparent distress  HEENT: Normocephalic and atraumatic; Tms WNL  Eyes:  PERRL; EOMi with anicteric sclera and clear conjunctivae; no nystagmus, no photophobia  Mouth:  Oropharynx clear and without " exudate; moist mucous membranes  Neck:   Cervical nodes not enlarged; supple; no bruits  Cardio:  Regular rate and rhythm with normal S1 and S2; no murmurs or rubs  Resp:  CTAB; respirations unlabored; no wheezes, crackles or rhonchi  Abdom: Soft, NTND with normoactive bowel sounds; negative HSM  Extrem: Warm and well-perfused with no clubbing, cyanosis or edema  Skin:  No rashes, lesions, or color changes  Pulses:  2+ and symmetric distally  Neuro:  AAOx3; cooperative and pleasant with no focal deficits    Laboratory:      Most Recent Data:  Lab Results   Component Value Date    WBC 4.30 09/23/2022    HGB 14.6 09/23/2022    HCT 42.9 09/23/2022     09/23/2022    ALT 15 09/23/2022    AST 16 09/23/2022     09/23/2022    K 4.0 09/23/2022     09/23/2022    BUN 10 09/23/2022    CO2 26 09/23/2022    TSH 1.082 09/23/2022    HGBA1C 5.1 09/23/2022              CBC:   WBC   Date Value Ref Range Status   09/23/2022 4.30 3.90 - 12.70 K/uL Final     Hemoglobin   Date Value Ref Range Status   09/23/2022 14.6 12.0 - 16.0 g/dL Final     Hematocrit   Date Value Ref Range Status   09/23/2022 42.9 37.0 - 48.5 % Final     Platelets   Date Value Ref Range Status   09/23/2022 223 150 - 450 K/uL Final     MCV   Date Value Ref Range Status   09/23/2022 97 82 - 98 fL Final     RDW   Date Value Ref Range Status   09/23/2022 11.9 11.5 - 14.5 % Final     BMP:   Sodium   Date Value Ref Range Status   09/23/2022 138 136 - 145 mmol/L Final     Potassium   Date Value Ref Range Status   09/23/2022 4.0 3.5 - 5.1 mmol/L Final     Chloride   Date Value Ref Range Status   09/23/2022 104 95 - 110 mmol/L Final     CO2   Date Value Ref Range Status   09/23/2022 26 23 - 29 mmol/L Final     BUN   Date Value Ref Range Status   09/23/2022 10 6 - 20 mg/dL Final     Creatinine   Date Value Ref Range Status   09/23/2022 0.7 0.5 - 1.4 mg/dL Final     Glucose   Date Value Ref Range Status   09/23/2022 95 70 - 110 mg/dL Final     Calcium   Date  Value Ref Range Status   09/23/2022 9.6 8.7 - 10.5 mg/dL Final     Magnesium   Date Value Ref Range Status   09/23/2022 1.9 1.6 - 2.6 mg/dL Final     LFTs:   Total Protein   Date Value Ref Range Status   09/23/2022 6.8 6.0 - 8.4 g/dL Final     Albumin   Date Value Ref Range Status   09/23/2022 4.0 3.5 - 5.2 g/dL Final     Total Bilirubin   Date Value Ref Range Status   09/23/2022 0.5 0.1 - 1.0 mg/dL Final     Comment:     For infants and newborns, interpretation of results should be based  on gestational age, weight and in agreement with clinical  observations.    Premature Infant recommended reference ranges:  Up to 24 hours.............<8.0 mg/dL  Up to 48 hours............<12.0 mg/dL  3-5 days..................<15.0 mg/dL  6-29 days.................<15.0 mg/dL       AST   Date Value Ref Range Status   09/23/2022 16 10 - 40 U/L Final     Alkaline Phosphatase   Date Value Ref Range Status   09/23/2022 30 (L) 55 - 135 U/L Final     ALT   Date Value Ref Range Status   09/23/2022 15 10 - 44 U/L Final     Coags: No results found for: INR, PROTIME, PTT  FLP:    No results found for: CHOL  No results found for: HDL  No results found for: LDLCALC  No results found for: TRIG  No results found for: CHOLHDL   DM:      Hemoglobin A1C   Date Value Ref Range Status   09/23/2022 5.1 4.0 - 5.6 % Final     Comment:     ADA Screening Guidelines:  5.7-6.4%  Consistent with prediabetes  >or=6.5%  Consistent with diabetes    High levels of fetal hemoglobin interfere with the HbA1C  assay. Heterozygous hemoglobin variants (HbS, HgC, etc)do  not significantly interfere with this assay.   However, presence of multiple variants may affect accuracy.       Creatinine   Date Value Ref Range Status   09/23/2022 0.7 0.5 - 1.4 mg/dL Final     Thyroid:   TSH   Date Value Ref Range Status   09/23/2022 1.082 0.400 - 4.000 uIU/mL Final     Free T4   Date Value Ref Range Status   09/23/2022 0.73 0.71 - 1.51 ng/dL Final     Anemia:   Ferritin   Date  Value Ref Range Status   09/23/2022 67 20.0 - 300.0 ng/mL Final     Cardiac: No results found for: TROPONINI, CKTOTAL, CKMB, BNP  Urinalysis: No results found for: LABURIN, COLORU, PHUA, CLARITYU, SPECGRAV, LABSPEC, NITRITE, PROTEINUR, GLUCOSEU, KETONESU, UROBILINOGEN, BILIRUBINUR, BLOODU, RBCU, WBCUA    Other Laboratory Data:      Other Results:  EKG (my interpretation):     Radiology:  Mammo Digital Screening Bilat w/ Willie  Narrative: Result:  Mammo Digital Screening Bilat w/ Willie    History:  Patient is 52 y.o. and is seen for a screening mammogram.    Films Compared:  Prior images (if available) were compared.     Findings:   This procedure was performed using tomosynthesis.   Computer-aided detection was utilized in the interpretation of this   examination.    The breasts are extremely dense, which lowers the sensitivity of   mammography. There is no evidence of suspicious masses,   microcalcifications or architectural distortion.  Impression:    No mammographic evidence of malignancy.    BI-RADS Category 1: Negative    Recommendation:  Routine screening mammogram in 1 year is recommended.    Your estimated lifetime risk of breast cancer (to age 85) based on   Tyrer-Cuzick risk assessment model is 25.57 %.  According to the American   Cancer Society, patients with a lifetime breast cancer risk of 20% or   higher might benefit from supplemental screening tests. ??           Assessment/Plan     Kamilah Romero is a 52 y.o. female with:    1. Dizziness  Assessment & Plan:  -possibly post-COVID sequelae  Celestone 6mg IM  -start Antivert 12.5 mg tid prn dizziness   Call Dr. Tucker if not improved    Orders:  -     meclizine (ANTIVERT) 12.5 mg tablet; Take 1 tablet (12.5 mg total) by mouth 3 (three) times daily as needed for Dizziness.  Dispense: 30 tablet; Refill: 3    2. Anxiety and depression  Assessment & Plan:  Stable on lexapro 20mg daily   Cont Therapist every other week       3. Preventative health  care  Overview:  Colon normal repeat 5/2029    Assessment & Plan:  -get Shingrex vaccine  Get MMG in 11/2023  F/u GYN in 9/2023                   I spent a total of 45 minutes on the day of the visit.This includes face to face time and non-face to face time preparing to see the patient (eg, review of tests), obtaining and/or reviewing separately obtained history, documenting clinical information in the electronic or other health record, independently interpreting results and communicating results to the patient/family/caregiver, or care coordinator.   Code Status:     Dary Tucker MD

## 2023-02-17 NOTE — ASSESSMENT & PLAN NOTE
Celestone 6mg IM today  -start Antivert 12.5mg up to 3 times per day -  Take at bedtime if it causes excessive sleepiness

## 2023-02-19 ENCOUNTER — PATIENT MESSAGE (OUTPATIENT)
Dept: PRIMARY CARE CLINIC | Facility: CLINIC | Age: 53
End: 2023-02-19
Payer: COMMERCIAL

## 2023-03-08 ENCOUNTER — PATIENT MESSAGE (OUTPATIENT)
Dept: PRIMARY CARE CLINIC | Facility: CLINIC | Age: 53
End: 2023-03-08
Payer: COMMERCIAL

## 2023-04-06 ENCOUNTER — TELEPHONE (OUTPATIENT)
Dept: PRIMARY CARE CLINIC | Facility: CLINIC | Age: 53
End: 2023-04-06

## 2023-04-07 RX ORDER — ONDANSETRON 4 MG/1
4 TABLET, FILM COATED ORAL EVERY 8 HOURS PRN
Qty: 90 TABLET | Refills: 3 | Status: SHIPPED | OUTPATIENT
Start: 2023-04-07 | End: 2023-05-07

## 2023-05-22 PROBLEM — Z00.00 PREVENTATIVE HEALTH CARE: Status: RESOLVED | Noted: 2023-02-16 | Resolved: 2023-05-22

## 2023-09-01 ENCOUNTER — PATIENT MESSAGE (OUTPATIENT)
Dept: PRIMARY CARE CLINIC | Facility: CLINIC | Age: 53
End: 2023-09-01
Payer: COMMERCIAL

## 2023-09-05 DIAGNOSIS — R42 DIZZINESS: Primary | ICD-10-CM

## 2023-09-05 DIAGNOSIS — R26.89 POOR BALANCE: ICD-10-CM

## 2023-09-11 NOTE — PROGRESS NOTES
Outpatient Neurology Consultation    Requesting physician: self-referred    Impression:  R cerebellar infarct, silent:  developmental in DDx.  If stroke, etiology ESUS at this point.  The lesion does not explain her chronic, daily vertigo and cognitive dysfunction.  Mild cognitive dysfunction: suspected long-COVID.  Depression, sleep disorder in DDx.   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  Will re-evaluate at f/u.    Stroke risk factors: stroke, leukoaraiosis, ex-tobacco (quit '18)    Plan:  Continue ASA 81mg/d  FLP; add statin if LDL > 100mg/dL  TCD bubble study for e/o PFO  Thrombophilia eval + B12  30 day cardiac event monitor  Avoid triptans, if possible  Trial of Nurtec prn  F/U with Dr. Wagner and vestibular therapy  Continue Lexapro 20mg/d   Neuropsych testing  Re-evaluate pupils at f/u  I will provide results of the above testing via [facility]  RTC 2 months      Problem List Items Addressed This Visit          Unprioritized    History of cerebellar stroke - Primary    Relevant Medications    rimegepant (NURTEC) 75 mg odt    Other Relevant Orders    Lipid Panel (Completed)    DRVVT    Cardiolipin antibody    Beta-2 Glycoprotein Abs (IgA, IgG, IgM)    Antithrombin III    Homocysteine, Serum (Completed)    Protein C Antigen, Total    Fibrinogen (Completed)    Protein C Activity    Protein S Antigen, Total    Protein S Activity    Prothrombin [id number] Mutation    Factor 5 leiden    TCD Emboli Detection w/ Intravenous    Cardiac event monitor    Ambulatory referral/consult to Adult Neuropsychology     Other Visit Diagnoses       Migraine without aura and without status migrainosus, not intractable        Relevant Medications    rimegepant (NURTEC) 75 mg odt    Cognitive dysfunction        Relevant Orders    Vitamin B12            CC:  cerebellar stroke    HPI:  54 y/o WF self-referred for evaluation of prior stroke.  She had  "COVID in '21 or '22 and Jan '23.  Subsequently, she developed "brain fog" and vertigo that is constant felt due to long-COVID.  Cognitive issues (short-term memory/attention) have improved over the last several months, associated with HBO.   There is not a history of clear acute onset of stroke-like symptoms, particularly referable to the cerebellum.  A neurologist, Dr. Jama, ordered a CTA which showed an area of R cerebellar encephalomalacia which was confirmed with MRI.    MRI brain (by report) 3/30/23 - R cerebellar encephalomalacia; few WM hyperintensities  CTA (by report) 4/25/23 - nl  Echo (by report) 6/26/23 - nl    She is seeing Dr. Key for vertigo; these records are not available.  No history of head/neck trauma.  No unexplained neck pain. No hx DVT/PE.  One miscarriage.   Father on Coumadin for DVTs.     Abortives   Imitrex, Maxalt    Prophylactics   None    Past Medical History:   Diagnosis Date    COVID-19 long hauler manifesting chronic neurologic symptoms 2/16/2023    Depression     stable    Lack of coordination 11/17/2022    Migraine with aura and without status migrainosus, not intractable 7/1/2019    Plan for Medrol for status migrainosus Sumatriptan 50mg prn Has tried Maxalt, Tylenol      Past Surgical History:   Procedure Laterality Date    BREAST BIOPSY Right 12/2020    BREAST BIOPSY Right 09/2020    VAGINAL DELIVERY        No outpatient medications have been marked as taking for the 9/12/23 encounter (Office Visit) with Drew Juarez MD.      Review of patient's allergies indicates:  No Known Allergies   Family History   Problem Relation Age of Onset    Breast cancer Paternal Grandmother         unk age of onset    Cancer Maternal Grandfather         stomach ca (possibly)    Ovarian cancer Neg Hx       Social History     Socioeconomic History    Marital status:    Tobacco Use    Smoking status: Former     Current packs/day: 0.00     Types: Cigarettes     Quit date: " "10/29/2018     Years since quittin.8    Smokeless tobacco: Never   Substance and Sexual Activity    Alcohol use: Yes     Alcohol/week: 7.0 standard drinks of alcohol     Types: 7 Glasses of wine per week    Drug use: Never    Sexual activity: Yes     Partners: Male     /77   Pulse (!) 56   Ht 5' 4.5" (1.638 m)   Wt 65.8 kg (145 lb)   BMI 24.50 kg/m²    Well developed, well nourished female  Extremities: no edema    Mental status:   Awake, alert and appropriately oriented   Normal recent and remote memory (3/5 at 5 min; 4/4 with cue)   Normal attention and concentration: spells JAKE backwards   Normal speech and language   Normal fund of knowledge   No extinction  Cranial nerves:   Normal funduscopic - discs sharp   Pupils: L smaller than R without change in the dark   EOMF without nystagmus   VFF   Normal facial sensation   Normal facial movements   Intact hearing bilaterally   Palate elevates symmetrically   Normal SCM and trapezius strength   Tongue midline  Motor:   No pronator drift   Normal FF movements bilaterally   Normal muscle tone, bulk and power   No abnormal movements  Sensory   Intact to LT,  temperature  DTRs   1+ and symmetric   Plantar responses are flexor bilaterally  Coordination   Intact to FNF, RAH, and HTS  Gait   Normal base and gait   Normal toe, heel and tandem   No Romberg    Data Reviewed:    Lab Results   Component Value Date    WBC 4.30 2022    HGB 14.6 2022    HCT 42.9 2022    MCV 97 2022     2022       CMP  Sodium   Date Value Ref Range Status   2022 138 136 - 145 mmol/L Final     Potassium   Date Value Ref Range Status   2022 4.0 3.5 - 5.1 mmol/L Final     Chloride   Date Value Ref Range Status   2022 104 95 - 110 mmol/L Final     CO2   Date Value Ref Range Status   2022 26 23 - 29 mmol/L Final     Glucose   Date Value Ref Range Status   2022 95 70 - 110 mg/dL Final     BUN   Date Value Ref Range Status " "  09/23/2022 10 6 - 20 mg/dL Final     Creatinine   Date Value Ref Range Status   09/23/2022 0.7 0.5 - 1.4 mg/dL Final     Calcium   Date Value Ref Range Status   09/23/2022 9.6 8.7 - 10.5 mg/dL Final     Total Protein   Date Value Ref Range Status   09/23/2022 6.8 6.0 - 8.4 g/dL Final     Albumin   Date Value Ref Range Status   09/23/2022 4.0 3.5 - 5.2 g/dL Final     Total Bilirubin   Date Value Ref Range Status   09/23/2022 0.5 0.1 - 1.0 mg/dL Final     Comment:     For infants and newborns, interpretation of results should be based  on gestational age, weight and in agreement with clinical  observations.    Premature Infant recommended reference ranges:  Up to 24 hours.............<8.0 mg/dL  Up to 48 hours............<12.0 mg/dL  3-5 days..................<15.0 mg/dL  6-29 days.................<15.0 mg/dL       Alkaline Phosphatase   Date Value Ref Range Status   09/23/2022 30 (L) 55 - 135 U/L Final     AST   Date Value Ref Range Status   09/23/2022 16 10 - 40 U/L Final     ALT   Date Value Ref Range Status   09/23/2022 15 10 - 44 U/L Final     Anion Gap   Date Value Ref Range Status   09/23/2022 8 8 - 16 mmol/L Final     eGFR   Date Value Ref Range Status   09/23/2022 >60 >60 mL/min/1.73 m^2 Final     No results found for: "[id number]"  Lab Results   Component Value Date    TSH 1.082 09/23/2022     72 mins chart review, MRI review, face to face, documentation    Drew Juarez MD     "

## 2023-09-12 ENCOUNTER — LAB VISIT (OUTPATIENT)
Dept: LAB | Facility: HOSPITAL | Age: 53
End: 2023-09-12
Attending: PSYCHIATRY & NEUROLOGY
Payer: COMMERCIAL

## 2023-09-12 ENCOUNTER — OFFICE VISIT (OUTPATIENT)
Dept: NEUROLOGY | Facility: CLINIC | Age: 53
End: 2023-09-12
Payer: COMMERCIAL

## 2023-09-12 VITALS
BODY MASS INDEX: 24.16 KG/M2 | SYSTOLIC BLOOD PRESSURE: 111 MMHG | HEIGHT: 65 IN | DIASTOLIC BLOOD PRESSURE: 77 MMHG | HEART RATE: 56 BPM | WEIGHT: 145 LBS

## 2023-09-12 DIAGNOSIS — G43.009 MIGRAINE WITHOUT AURA AND WITHOUT STATUS MIGRAINOSUS, NOT INTRACTABLE: ICD-10-CM

## 2023-09-12 DIAGNOSIS — Z86.73 HISTORY OF CEREBELLAR STROKE: ICD-10-CM

## 2023-09-12 DIAGNOSIS — Z86.73 HISTORY OF CEREBELLAR STROKE: Primary | ICD-10-CM

## 2023-09-12 DIAGNOSIS — F09 COGNITIVE DYSFUNCTION: ICD-10-CM

## 2023-09-12 LAB
CHOLEST SERPL-MCNC: 242 MG/DL (ref 120–199)
CHOLEST/HDLC SERPL: 3.3 {RATIO} (ref 2–5)
FIBRINOGEN PPP-MCNC: 304 MG/DL (ref 182–400)
HCYS SERPL-SCNC: 5.5 UMOL/L (ref 4–15.5)
HDLC SERPL-MCNC: 73 MG/DL (ref 40–75)
HDLC SERPL: 30.2 % (ref 20–50)
LDLC SERPL CALC-MCNC: 155.4 MG/DL (ref 63–159)
NONHDLC SERPL-MCNC: 169 MG/DL
TRIGL SERPL-MCNC: 68 MG/DL (ref 30–150)

## 2023-09-12 PROCEDURE — 85302 CLOT INHIBIT PROT C ANTIGEN: CPT | Performed by: PSYCHIATRY & NEUROLOGY

## 2023-09-12 PROCEDURE — 3008F PR BODY MASS INDEX (BMI) DOCUMENTED: ICD-10-PCS | Mod: CPTII,S$GLB,, | Performed by: PSYCHIATRY & NEUROLOGY

## 2023-09-12 PROCEDURE — 86147 CARDIOLIPIN ANTIBODY EA IG: CPT | Performed by: PSYCHIATRY & NEUROLOGY

## 2023-09-12 PROCEDURE — 83090 ASSAY OF HOMOCYSTEINE: CPT | Performed by: PSYCHIATRY & NEUROLOGY

## 2023-09-12 PROCEDURE — 85384 FIBRINOGEN ACTIVITY: CPT | Performed by: PSYCHIATRY & NEUROLOGY

## 2023-09-12 PROCEDURE — 85613 RUSSELL VIPER VENOM DILUTED: CPT | Performed by: PSYCHIATRY & NEUROLOGY

## 2023-09-12 PROCEDURE — 99999 PR PBB SHADOW E&M-EST. PATIENT-LVL III: CPT | Mod: PBBFAC,,, | Performed by: PSYCHIATRY & NEUROLOGY

## 2023-09-12 PROCEDURE — 99205 OFFICE O/P NEW HI 60 MIN: CPT | Mod: S$GLB,,, | Performed by: PSYCHIATRY & NEUROLOGY

## 2023-09-12 PROCEDURE — 3078F DIAST BP <80 MM HG: CPT | Mod: CPTII,S$GLB,, | Performed by: PSYCHIATRY & NEUROLOGY

## 2023-09-12 PROCEDURE — 85303 CLOT INHIBIT PROT C ACTIVITY: CPT | Performed by: PSYCHIATRY & NEUROLOGY

## 2023-09-12 PROCEDURE — 85300 ANTITHROMBIN III ACTIVITY: CPT | Performed by: PSYCHIATRY & NEUROLOGY

## 2023-09-12 PROCEDURE — 85306 CLOT INHIBIT PROT S FREE: CPT | Performed by: PSYCHIATRY & NEUROLOGY

## 2023-09-12 PROCEDURE — 99205 PR OFFICE/OUTPT VISIT, NEW, LEVL V, 60-74 MIN: ICD-10-PCS | Mod: S$GLB,,, | Performed by: PSYCHIATRY & NEUROLOGY

## 2023-09-12 PROCEDURE — 36415 COLL VENOUS BLD VENIPUNCTURE: CPT | Performed by: PSYCHIATRY & NEUROLOGY

## 2023-09-12 PROCEDURE — 3074F PR MOST RECENT SYSTOLIC BLOOD PRESSURE < 130 MM HG: ICD-10-PCS | Mod: CPTII,S$GLB,, | Performed by: PSYCHIATRY & NEUROLOGY

## 2023-09-12 PROCEDURE — 99999 PR PBB SHADOW E&M-EST. PATIENT-LVL III: ICD-10-PCS | Mod: PBBFAC,,, | Performed by: PSYCHIATRY & NEUROLOGY

## 2023-09-12 PROCEDURE — 1160F PR REVIEW ALL MEDS BY PRESCRIBER/CLIN PHARMACIST DOCUMENTED: ICD-10-PCS | Mod: CPTII,S$GLB,, | Performed by: PSYCHIATRY & NEUROLOGY

## 2023-09-12 PROCEDURE — 3074F SYST BP LT 130 MM HG: CPT | Mod: CPTII,S$GLB,, | Performed by: PSYCHIATRY & NEUROLOGY

## 2023-09-12 PROCEDURE — 1159F PR MEDICATION LIST DOCUMENTED IN MEDICAL RECORD: ICD-10-PCS | Mod: CPTII,S$GLB,, | Performed by: PSYCHIATRY & NEUROLOGY

## 2023-09-12 PROCEDURE — 86146 BETA-2 GLYCOPROTEIN ANTIBODY: CPT | Performed by: PSYCHIATRY & NEUROLOGY

## 2023-09-12 PROCEDURE — 3008F BODY MASS INDEX DOCD: CPT | Mod: CPTII,S$GLB,, | Performed by: PSYCHIATRY & NEUROLOGY

## 2023-09-12 PROCEDURE — 85610 PROTHROMBIN TIME: CPT | Performed by: PSYCHIATRY & NEUROLOGY

## 2023-09-12 PROCEDURE — 1159F MED LIST DOCD IN RCRD: CPT | Mod: CPTII,S$GLB,, | Performed by: PSYCHIATRY & NEUROLOGY

## 2023-09-12 PROCEDURE — 80061 LIPID PANEL: CPT | Performed by: PSYCHIATRY & NEUROLOGY

## 2023-09-12 PROCEDURE — 1160F RVW MEDS BY RX/DR IN RCRD: CPT | Mod: CPTII,S$GLB,, | Performed by: PSYCHIATRY & NEUROLOGY

## 2023-09-12 PROCEDURE — 3078F PR MOST RECENT DIASTOLIC BLOOD PRESSURE < 80 MM HG: ICD-10-PCS | Mod: CPTII,S$GLB,, | Performed by: PSYCHIATRY & NEUROLOGY

## 2023-09-12 PROCEDURE — 85305 CLOT INHIBIT PROT S TOTAL: CPT | Performed by: PSYCHIATRY & NEUROLOGY

## 2023-09-12 RX ORDER — RIMEGEPANT SULFATE 75 MG/75MG
75 TABLET, ORALLY DISINTEGRATING ORAL ONCE AS NEEDED
Qty: 8 TABLET | Refills: 2 | Status: SHIPPED | OUTPATIENT
Start: 2023-09-12 | End: 2023-09-12

## 2023-09-14 ENCOUNTER — PATIENT MESSAGE (OUTPATIENT)
Dept: NEUROLOGY | Facility: CLINIC | Age: 53
End: 2023-09-14
Payer: COMMERCIAL

## 2023-09-14 ENCOUNTER — TELEPHONE (OUTPATIENT)
Dept: NEUROLOGY | Facility: CLINIC | Age: 53
End: 2023-09-14
Payer: COMMERCIAL

## 2023-09-14 LAB
AT III ACT/NOR PPP CHRO: >131 % (ref 83–118)
PROT C ACT/NOR PPP CHRO: 113 % (ref 70–150)
PROT C AG ACT/NOR PPP IA: 103 % (ref 72–160)
PROT S ACT/NOR PPP: 95 % (ref 65–150)

## 2023-09-14 NOTE — TELEPHONE ENCOUNTER
----- Message from Kylah Bergeron sent at 9/14/2023  2:32 PM CDT -----  Regarding: advise / [facility] calling  Contact: Daniela @ [phone] option 1  Caller, Daniela with [facility] is calling asking to speak w/ someone in the office to verify the quantity / dosage amount for rx:  rimegepant (NURTEC) 75 mg odt. Caller is asking for a return call back at: [phone] option 1. Thanks.

## 2023-09-15 LAB
B2 GLYCOPROT1 IGA SER QL: 0.8 U/ML
B2 GLYCOPROT1 IGG SER QL: <0.8 U/ML
B2 GLYCOPROT1 IGM SER QL: 3.2 U/ML
CARDIOLIPIN IGG SER IA-ACNC: <9.4 GPL (ref 0–14.99)
CARDIOLIPIN IGM SER IA-ACNC: <9.4 MPL (ref 0–12.49)
PROT S AG ACT/NOR PPP IA: 87 % (ref 50–140)

## 2023-09-18 ENCOUNTER — PATIENT MESSAGE (OUTPATIENT)
Dept: NEUROLOGY | Facility: CLINIC | Age: 53
End: 2023-09-18
Payer: COMMERCIAL

## 2023-09-18 DIAGNOSIS — R42 EPISODE OF DIZZINESS: ICD-10-CM

## 2023-09-18 DIAGNOSIS — Z86.73 HISTORY OF CEREBELLAR STROKE: Primary | ICD-10-CM

## 2023-09-18 DIAGNOSIS — F09 COGNITIVE DYSFUNCTION: ICD-10-CM

## 2023-09-28 ENCOUNTER — CLINICAL SUPPORT (OUTPATIENT)
Dept: CARDIOLOGY | Facility: HOSPITAL | Age: 53
End: 2023-09-28
Attending: PSYCHIATRY & NEUROLOGY
Payer: COMMERCIAL

## 2023-09-28 DIAGNOSIS — Z86.73 HISTORY OF CEREBELLAR STROKE: ICD-10-CM

## 2023-09-28 PROCEDURE — 93270 REMOTE 30 DAY ECG REV/REPORT: CPT

## 2023-09-28 PROCEDURE — 93272 ECG/REVIEW INTERPRET ONLY: CPT | Mod: ,,, | Performed by: INTERNAL MEDICINE

## 2023-09-28 PROCEDURE — 93272 CARDIAC EVENT MONITOR (CUPID ONLY): ICD-10-PCS | Mod: ,,, | Performed by: INTERNAL MEDICINE

## 2023-09-28 NOTE — TELEPHONE ENCOUNTER
Called and spoke w pt. Pt scheduled for EEG 10/4 at 11am. Pt confirmed date/time of appt. Cardiac event monitor also scheduled to be mailed out to pt.

## 2023-10-02 DIAGNOSIS — Z01.419 WELL WOMAN EXAM WITH ROUTINE GYNECOLOGICAL EXAM: ICD-10-CM

## 2023-10-02 RX ORDER — ESCITALOPRAM OXALATE 20 MG/1
20 TABLET ORAL DAILY
Qty: 90 TABLET | Refills: 0 | Status: SHIPPED | OUTPATIENT
Start: 2023-10-02 | End: 2023-12-24

## 2023-10-02 NOTE — TELEPHONE ENCOUNTER
Refill Decision Note   Kamilah Nick  is requesting a refill authorization.  Brief Assessment and Rationale for Refill:  Approve     Medication Therapy Plan:         Comments:     Note composed:3:46 PM 10/02/2023

## 2023-10-04 ENCOUNTER — HOSPITAL ENCOUNTER (OUTPATIENT)
Dept: NEUROLOGY | Facility: CLINIC | Age: 53
Discharge: HOME | End: 2023-10-04
Payer: COMMERCIAL

## 2023-10-04 ENCOUNTER — LAB VISIT (OUTPATIENT)
Dept: LAB | Facility: HOSPITAL | Age: 53
End: 2023-10-04
Attending: PSYCHIATRY & NEUROLOGY
Payer: COMMERCIAL

## 2023-10-04 DIAGNOSIS — Z86.73 HISTORY OF CEREBELLAR STROKE: ICD-10-CM

## 2023-10-04 DIAGNOSIS — R42 EPISODE OF DIZZINESS: ICD-10-CM

## 2023-10-04 DIAGNOSIS — F09 COGNITIVE DYSFUNCTION: ICD-10-CM

## 2023-10-04 LAB
ALBUMIN SERPL BCP-MCNC: 4.1 G/DL (ref 3.5–5.2)
ALP SERPL-CCNC: 35 U/L (ref 55–135)
ALT SERPL W/O P-5'-P-CCNC: 15 U/L (ref 10–44)
ANION GAP SERPL CALC-SCNC: 6 MMOL/L (ref 8–16)
AST SERPL-CCNC: 19 U/L (ref 10–40)
BASOPHILS # BLD AUTO: 0.03 K/UL (ref 0–0.2)
BASOPHILS NFR BLD: 0.5 % (ref 0–1.9)
BILIRUB SERPL-MCNC: 0.4 MG/DL (ref 0.1–1)
BUN SERPL-MCNC: 12 MG/DL (ref 6–20)
CALCIUM SERPL-MCNC: 9.3 MG/DL (ref 8.7–10.5)
CHLORIDE SERPL-SCNC: 103 MMOL/L (ref 95–110)
CO2 SERPL-SCNC: 29 MMOL/L (ref 23–29)
CREAT SERPL-MCNC: 1 MG/DL (ref 0.5–1.4)
DIFFERENTIAL METHOD: ABNORMAL
EOSINOPHIL # BLD AUTO: 0.1 K/UL (ref 0–0.5)
EOSINOPHIL NFR BLD: 1.6 % (ref 0–8)
ERYTHROCYTE [DISTWIDTH] IN BLOOD BY AUTOMATED COUNT: 11.5 % (ref 11.5–14.5)
EST. GFR  (NO RACE VARIABLE): >60 ML/MIN/1.73 M^2
GLUCOSE SERPL-MCNC: 91 MG/DL (ref 70–110)
HCT VFR BLD AUTO: 42.1 % (ref 37–48.5)
HGB BLD-MCNC: 14.3 G/DL (ref 12–16)
IMM GRANULOCYTES # BLD AUTO: 0.02 K/UL (ref 0–0.04)
IMM GRANULOCYTES NFR BLD AUTO: 0.3 % (ref 0–0.5)
LYMPHOCYTES # BLD AUTO: 1.8 K/UL (ref 1–4.8)
LYMPHOCYTES NFR BLD: 31.9 % (ref 18–48)
MCH RBC QN AUTO: 32.7 PG (ref 27–31)
MCHC RBC AUTO-ENTMCNC: 34 G/DL (ref 32–36)
MCV RBC AUTO: 96 FL (ref 82–98)
MONOCYTES # BLD AUTO: 0.3 K/UL (ref 0.3–1)
MONOCYTES NFR BLD: 5.6 % (ref 4–15)
NEUTROPHILS # BLD AUTO: 3.5 K/UL (ref 1.8–7.7)
NEUTROPHILS NFR BLD: 60.1 % (ref 38–73)
NRBC BLD-RTO: 0 /100 WBC
PLATELET # BLD AUTO: 215 K/UL (ref 150–450)
PMV BLD AUTO: 11.7 FL (ref 9.2–12.9)
POTASSIUM SERPL-SCNC: 3.9 MMOL/L (ref 3.5–5.1)
PROT SERPL-MCNC: 6.7 G/DL (ref 6–8.4)
RBC # BLD AUTO: 4.37 M/UL (ref 4–5.4)
SODIUM SERPL-SCNC: 138 MMOL/L (ref 136–145)
VIT B12 SERPL-MCNC: 570 PG/ML (ref 210–950)
WBC # BLD AUTO: 5.76 K/UL (ref 3.9–12.7)

## 2023-10-04 PROCEDURE — 85025 COMPLETE CBC W/AUTO DIFF WBC: CPT | Performed by: PSYCHIATRY & NEUROLOGY

## 2023-10-04 PROCEDURE — 80053 COMPREHEN METABOLIC PANEL: CPT | Performed by: PSYCHIATRY & NEUROLOGY

## 2023-10-04 PROCEDURE — 82607 VITAMIN B-12: CPT | Performed by: PSYCHIATRY & NEUROLOGY

## 2023-10-04 PROCEDURE — 95819 PR EEG,W/AWAKE & ASLEEP RECORD: ICD-10-PCS | Mod: S$GLB,,, | Performed by: PSYCHIATRY & NEUROLOGY

## 2023-10-04 PROCEDURE — 36415 COLL VENOUS BLD VENIPUNCTURE: CPT | Performed by: PSYCHIATRY & NEUROLOGY

## 2023-10-04 PROCEDURE — 83921 ORGANIC ACID SINGLE QUANT: CPT | Performed by: PSYCHIATRY & NEUROLOGY

## 2023-10-04 PROCEDURE — 95819 EEG AWAKE AND ASLEEP: CPT | Mod: S$GLB,,, | Performed by: PSYCHIATRY & NEUROLOGY

## 2023-10-04 PROCEDURE — 81240 F2 GENE: CPT | Performed by: PSYCHIATRY & NEUROLOGY

## 2023-10-04 PROCEDURE — 81241 F5 GENE: CPT | Performed by: PSYCHIATRY & NEUROLOGY

## 2023-10-06 NOTE — PROCEDURES
Routine EEG Report    Kamilah Romero  [id number]  1970    DATE OF SERVICE: 10/4/2023  REASON FOR CONSULT:  53-year-old woman with a previous small cerebellar infarct, migraines, and intermittent vertigo.  Evaluate for evidence of epileptiform activity.    METHODOLOGY   Electroencephalographic (EEG) recording is with electrodes placed according to the International 10-20 placement system.  Thirty two (32) channels of digital signal (sampling rate of 512/sec) including T1 and T2 was simultaneously recorded from the scalp and may include  EKG, EMG, and/or eye monitors.  Recording band pass was 0.1 to 512 hz.  Digital video recording of the patient is simultaneously recorded with the EEG.  The patient is instructed report clinical symptoms which may occur during the recording session.  EEG and video recording is stored and archived in digital format. Activation procedures which include photic stimulation, hyperventilation and instructing patients to perform simple task are done in selected patients.    The EEG is displayed on a monitor screen and can be reviewed using different montages.  Computer assisted analysis is employed to detect spike and electrographic seizure activity.   The entire record is submitted for computer analysis.  The entire recording is visually reviewed and the times identified by computer analysis as being spikes or seizures are reviewed again.  Compresses spectral analysis (CSA) is also performed on the activity recorded from each individual channel.  This is displayed as a power display of frequencies from 0 to 30 Hz over time.   The CSA is reviewed looking for asymmetries in power between homologous areas of the scalp and then compared with the original EEG recording.     Re5ult software is also utilized in the review of this study.  This software suite analyzes the EEG recording in multiple domains.  Coherence and rhythmicity is computed to identify EEG sections which may contain  organized seizures.  Each channel undergoes analysis to detect presence of spike and sharp waves which have special and morphological characteristic of epileptic activity.  The routine EEG recording is converted from spacial into frequency domain.  This is then displayed comparing homologous areas to identify areas of significant asymmetry.  Algorithm to identify non-cortically generated artifact is used to separate eye movement, EMG and other artifact from the EEG.      EEG FINDINGS  Background activity:   The waking background is continuous and symmetric with a well-formed 10 hz posterior dominant rhythm seen bilaterally.      Sleep:  The patient transitions from wakefulness to sleep with the appearance of sleep spindles, K complexes, and vertex waves.    Activation procedures:   The patient is able to follow simple commands and answers orientation questions correctly. Photic stimulation is performed with a prominent symmetric photic driving response noted at 6-12 and 18-20 flashes per second (fps).  Hyperventilation is performed with good effort with no activation of the record.     Cardiac Monitor:   Heart rate appears generally regular on a single lead EKG.    Impression:   This is a normal awake and asleep routine EEG.  There are no prominent focal findings, no epileptiform discharges, and no electrographic seizures.   Of note, a normal EEG does not rule out the diagnosis of epilepsy.  Clinical correlation is advised.    Amy Leiva MD PhD [facility]  Neurology-Epilepsy  [facility].

## 2023-10-09 LAB
F2 C.20210G>A GENO BLD/T: NEGATIVE
F5 P.R506Q BLD/T QL: NEGATIVE

## 2023-10-10 ENCOUNTER — PATIENT MESSAGE (OUTPATIENT)
Dept: NEUROLOGY | Facility: CLINIC | Age: 53
End: 2023-10-10
Payer: COMMERCIAL

## 2023-10-10 DIAGNOSIS — Z86.73 HISTORY OF CEREBELLAR STROKE: Primary | ICD-10-CM

## 2023-10-10 LAB — METHYLMALONATE SERPL-SCNC: 0.16 UMOL/L

## 2023-10-23 ENCOUNTER — PATIENT MESSAGE (OUTPATIENT)
Dept: OBSTETRICS AND GYNECOLOGY | Facility: CLINIC | Age: 53
End: 2023-10-23
Payer: COMMERCIAL

## 2023-10-23 DIAGNOSIS — Z12.39 SCREENING BREAST EXAMINATION: Primary | ICD-10-CM

## 2023-10-23 DIAGNOSIS — N39.3 SUI (STRESS URINARY INCONTINENCE, FEMALE): ICD-10-CM

## 2023-11-07 ENCOUNTER — TELEPHONE (OUTPATIENT)
Dept: NEUROLOGY | Facility: CLINIC | Age: 53
End: 2023-11-07
Payer: COMMERCIAL

## 2023-11-10 ENCOUNTER — CLINICAL SUPPORT (OUTPATIENT)
Dept: REHABILITATION | Facility: OTHER | Age: 53
End: 2023-11-10
Attending: OBSTETRICS & GYNECOLOGY
Payer: COMMERCIAL

## 2023-11-10 DIAGNOSIS — R29.898 WEAKNESS OF RIGHT HIP: ICD-10-CM

## 2023-11-10 DIAGNOSIS — N39.3 SUI (STRESS URINARY INCONTINENCE, FEMALE): ICD-10-CM

## 2023-11-10 DIAGNOSIS — M79.18 MYALGIA OF PELVIC FLOOR: ICD-10-CM

## 2023-11-10 PROCEDURE — 97161 PT EVAL LOW COMPLEX 20 MIN: CPT

## 2023-11-10 PROCEDURE — 97530 THERAPEUTIC ACTIVITIES: CPT

## 2023-11-10 PROCEDURE — 97112 NEUROMUSCULAR REEDUCATION: CPT

## 2023-11-10 NOTE — PLAN OF CARE
"[facility]  Pelvic Health Physical Therapy Initial Evaluation    Date: 11/10/2023   Name: Kamilah Romero  Clinic Number: [id number]  Therapy Diagnosis: No diagnosis found.  Physician: Dorita Holley DO    Physician Orders: PT Eval and Treat  Medical Diagnosis from Referral: N39.3 (ICD-10-CM) - CELE (stress urinary incontinence, female)  Evaluation Date: 11/10/2023  Authorization Period Expiration: 1 visit  Plan of Care Expiration: 24  Visit # / Visits authorized:     Time In: 910  Time Out: 100  Total Appointment Time (timed & untimed codes): 50 minutes    Precautions: universal    Subjective     Date of onset:     History of current condition - Kamilah reports: Fell off a horse in . Had pain since then. Had MRI when she had her son and had healed L1 fracture. Most of pain is in hip. Feels like it chronically "goes out". When hip is out it hurts. Has to go see chiropractor or other  to have hip put back in and feel better. Hip is very weak, feels like inner leg is not activated.   PMHx: had stroke this year, they dont know why maybe COVID related, has dizziness and ataxia as sequelae    OB/GYN History:  and vaginal delivery  Surgical History: none  Birth Control: none  History of chronic yeast, BV or UTIs? No  Sexually active? Yes      Bladder/Bowel History:   Frequency of urination:   Daytime: TBA           Nighttime: TBA  Bladder leakage: Yes      Pain:  Location: back - lumbar, groin, and right hip(s)     Medical History: Kamilah  has a past medical history of COVID-19 long hauler manifesting chronic neurologic symptoms (2023), Depression, Lack of coordination (2022), and Migraine with aura and without status migrainosus, not intractable (2019).     Surgical History: Kamilah Romero  has a past surgical history that includes Breast biopsy (Right, 2020); Breast biopsy (Right, 2020); and Vaginal delivery.    Medications: Kamilah has a " current medication list which includes the following prescription(s): escitalopram oxalate and meclizine.    Allergies: Review of patient's allergies indicates:  No Known Allergies     Prior Therapy/Previous treatment included: myofascial bodyworker, chiropractic  Social History:  lives with their spouse  Current exercise: yoga  Occupation: Pt works as a  and job-related duties include active.    Types of fluid intake: TBA  Diet: TBA     Abuse/Neglect: No     Pts goals: to have decreased pain and improved balance and function    OBJECTIVE     See EMR under MEDIA for written consent provided 11/10/2023  Chaperone: Declined    ORTHO SCREEN  Posture in sitting: WNL  Posture in standing: increased b/l femoral internal rotation, mild medial collapse at R ankle, prefers to stand with slight R foot ER to correct knee alignment, limited great toe extension R  Pelvic alignment: no sign of deviations noted in supine   SLS: decreased R SLS  Lumbar ROM: full and painfree  Pubic symphysis: joint painfree but TTP around R side of joint at soft tissue attachments, particularly rectus abdominus  Palpation hip mm: TTP R glut med, OI externally, pectineus mm  Hip ROM:  excessive ER b/l and symmetrical, IR 15 R, 35 L  Hip strength: R glut med - end range weakness and 3-/5 mid-range, Glut max: 3+/5 b/l      ABDOMINAL WALL ASSESSMENT  Palpation: tender R rectus at pubic insertion  Abdominal strength: poor synergy deep core with poor pressure management, caudally directed forces with increased IAP, oblique overactivity, with ASLR poor load transfer on L  Scarring: none  Pelvic Floor Muscle and Transverse Abdominus Synergy: absent  Diastasis: absent      BREATHING MECHANICS ASSESSMENT   Thorax Assessment During Quiet Respiration: WNL excursion of ribcage and WNL excursion of abdominal wall  Thorax Assessment During Deep Respiration: WNL excursion of ribcage and WNL excursion of abdominal wall    VAGINAL PELVIC FLOOR  EXAM  Comments: deferred     TREATMENT     Treatment Time In: 935  Treatment Time Out: 1000  Total Treatment time (time-based codes) separate from Evaluation: 25 minutes    Manual Therapy to develop flexibility and extensibility for 10 minutes including:   MET of R hip anterior position    Therapeutic Activity Patient participated in dynamic functional therapeutic activities to improve functional performance for 15 minutes. Including: Education as described below.   A&P of pelvic floor mm and POC  How to perform hip reset at home    Patient Education provided:   general anatomy/physiology of urinary/ bowel  system and benefits of treatment were discussed with the pt. Additionally, anatomy/physiology of pelvic floor, posture/body mechanices, and diaphragmatic breathing were reviewed.     Home Exercises provided:  Written Home Exercises provided: Yes  Exercises were reviewed and Kamilah was able to demonstrate them prior to the end of the session.    Kamilah demonstrated good  understanding of the education provided.     See EMR under Patient Instructions for exercises provided 11/10/2023.    Assessment     Kamilah is a 53 y.o. female referred to outpatient Physical Therapy with a medical diagnosis of stress incontinence. Pt presents with altered posture, poor trunk stability, pelvic floor tenderness, increased tension of the pelvic muscles, poor coordination of pelvic floor muscles during ADL's leading to urinary or fecal leakage, unable to co-contract or co-relax abdominal wall and pelvic floor muscles, and hip girdle weakness. Posture abnormalities noted as well as R hip IR and glut med strength deficits. Suspect slight anterior positioning R hip which improved following mobilization technique. Instructed how she can perform that at home. Will initiate stabilization training at next session as well as address mm tension that is likely secondary to compensation at next session. Complete pelvic exam to be completed  at future sessions but R OI symptomatic as assessed with external palpation.    Pt prognosis is Excellent  Pt will benefit from skilled outpatient Physical Therapy to address the deficits stated above and in the chart below, provide pt/family education, and to maximize pt's level of independence.     Plan of care discussed with patient: Yes  Pt's spiritual, cultural and educational needs considered and patient is agreeable to the plan of care and goals as stated below:     Anticipated Barriers for therapy: None    Medical Necessity is demonstrated by the following:    History  Co-morbidities and personal factors that may impact the plan of care Co-morbidities   Traumatuc injury - fell of horse    Personal Factors  no deficits     low   Examination  Body structures and functions, activity limitations and participation restrictions that may impact the plan of care Body Regions/Systems/Functions:  altered posture, poor trunk stability, pelvic floor tenderness, increased tension of the pelvic muscles, poor coordination of pelvic floor muscles during ADL's leading to urinary or fecal leakage, unable to co-contract or co-relax abdominal wall and pelvic floor muscles, and hip girdle weakness     Activity Limitations:  Pain with ADLs    Participation Restrictions:  ADL participation affected by pain    Activity limitations:   Learning and applying knowledge  No deficits    General Tasks and Commands  No deficits    Communication  No deficits    Mobility  No deficits    Self care  No deficits    Domestic Life  No deficits    Interactions/Relationships  No deficits    Life Areas  No deficits    Community and Social Life  No deficits       moderate   Clinical Presentation stable and uncomplicated low   Decision Making/ Complexity Score: low       Goals:  Short Term Goals: 4 weeks   Pt indep in HEP  Pt indep in fhip self correction technique for decreased strength  Pt demo pelvic floor mm strength at least 3/5 for improved  continence and support of pelvic organs      Long Term Goals: 8 weeks   Pt indep in progressive HEP  Pt reports 0 episodes of leakage in at least 2 weeks for improved ADL participation  Pt demo pelvic floor mm strength at least 4/5 for improved continence and support of pelvic organs  R hip IR ROM increased to 35 degree for ROM WFL  R hip glut med strength increased to 4/5 for improved pelvic girdle support      Plan     Plan of care Certification: 11/10/2023 to 2/8/24.    Outpatient Physical Therapy 1 times weekly for 12 weeks to include the following interventions: therapeutic exercises, therapeutic activity, neuromuscular re-education, manual therapy, patient/family education and self care/home management    Marlene Kat, PT

## 2023-11-15 ENCOUNTER — PATIENT MESSAGE (OUTPATIENT)
Dept: OBSTETRICS AND GYNECOLOGY | Facility: CLINIC | Age: 53
End: 2023-11-15
Payer: COMMERCIAL

## 2023-11-17 NOTE — TELEPHONE ENCOUNTER
Called [facility] [phone] and left a voice mail with detailed patient information and to schedule patient's TCD. Provided name, title, and direct contact line.

## 2023-11-21 ENCOUNTER — HOSPITAL ENCOUNTER (OUTPATIENT)
Dept: RADIOLOGY | Facility: OTHER | Age: 53
Discharge: HOME | End: 2023-11-21
Attending: OBSTETRICS & GYNECOLOGY
Payer: COMMERCIAL

## 2023-11-21 DIAGNOSIS — Z12.31 ENCOUNTER FOR SCREENING MAMMOGRAM FOR BREAST CANCER: ICD-10-CM

## 2023-11-21 PROCEDURE — 77063 MAMMO DIGITAL SCREENING BILAT WITH TOMO: ICD-10-PCS | Mod: 26,,, | Performed by: RADIOLOGY

## 2023-11-21 PROCEDURE — 77063 BREAST TOMOSYNTHESIS BI: CPT | Mod: 26,,, | Performed by: RADIOLOGY

## 2023-11-21 PROCEDURE — 77067 SCR MAMMO BI INCL CAD: CPT | Mod: 26,,, | Performed by: RADIOLOGY

## 2023-11-21 PROCEDURE — 77067 MAMMO DIGITAL SCREENING BILAT WITH TOMO: ICD-10-PCS | Mod: 26,,, | Performed by: RADIOLOGY

## 2023-11-21 PROCEDURE — 77067 SCR MAMMO BI INCL CAD: CPT | Mod: TC

## 2023-11-22 ENCOUNTER — CLINICAL SUPPORT (OUTPATIENT)
Dept: REHABILITATION | Facility: OTHER | Age: 53
End: 2023-11-22
Attending: OBSTETRICS & GYNECOLOGY
Payer: COMMERCIAL

## 2023-11-22 ENCOUNTER — PATIENT MESSAGE (OUTPATIENT)
Dept: NEUROLOGY | Facility: CLINIC | Age: 53
End: 2023-11-22
Payer: COMMERCIAL

## 2023-11-22 DIAGNOSIS — R29.898 WEAKNESS OF RIGHT HIP: ICD-10-CM

## 2023-11-22 DIAGNOSIS — M79.18 MYALGIA OF PELVIC FLOOR: Primary | ICD-10-CM

## 2023-11-22 PROCEDURE — 97112 NEUROMUSCULAR REEDUCATION: CPT

## 2023-11-22 PROCEDURE — 97140 MANUAL THERAPY 1/> REGIONS: CPT

## 2023-11-22 PROCEDURE — 97530 THERAPEUTIC ACTIVITIES: CPT

## 2023-11-22 NOTE — PROGRESS NOTES
Pelvic Health Physical Therapy   Treatment Note     Name: Kamilah Goldstein [facility] Number: [id number]    Therapy Diagnosis: No diagnosis found.  Physician: Dorita Holley DO    Visit Date: 11/22/2023    Physician Orders: PT Eval and Treat  Medical Diagnosis from Referral: N39.3 (ICD-10-CM) - CELE (stress urinary incontinence, female)  Evaluation Date: 11/10/2023  Authorization Period Expiration: 1 visit  Plan of Care Expiration: 2/8/24  Visit # / Visits authorized: 1/ 1  Cancelled Visits: 0  No Show Visits: 0    FOTO: not completed    Time In: 1100  Time Out: 1200  Total Billable Time: 60 minutes    Precautions: Standard    Subjective     Pt reports: Has been feeling a little better but started having pulling sensation around tailbone. Maybe things shifting.  She was compliant with home exercise program.  Response to previous treatment: felt fine  Functional change: no change    Pain: 0/10  Location:     Objective   Pt verbally consents to intravaginal treatment today.  Signed consent form already on file.    Kamilah received the following manual therapy techniques: to develop flexibility and extensibility for 25 minutes including: MET of R hip and trigger point/myofascial release of pelvic floor mm and R OI  FDN R pectineus and glut med    Kamilah participated in neuromuscular re-education activities to develop Coordination, Control, and Down training for 25 minutes including: diaphragmatic breathing with Kegel, diaphragmatic breathing, and pelvic floor mm contractions focus on activation at 3 layers sequentially in isolation and then sequentially  R glut med activation - clamshellls, SL hip ad  TrA > quadruped     Kamilah participated in dynamic functional therapeutic activities to improve functional performance for 10 minutes, including:  POC, monitoring how pelvis responds , body mechanics     Home Exercises Provided and Patient Education Provided     Education provided:   - anatomy/physiology of pelvic  floor, posture/body mechanices, and diaphragmatic breathing  Discussed progression of plan of care with patient; educated pt in activity modification; reviewed HEP with pt. Pt demonstrated and verbalized understanding of all instruction and was provided with a handout of HEP (see Patient Instructions).    Written Home Exercises Provided: Patient instructed to cont prior HEP.  Exercises were reviewed and Kamilah was able to demonstrate them prior to the end of the session.  Kamilah demonstrated good  understanding of the education provided.     See EMR under Patient Instructions for exercises provided 11/22/2023.    Assessment     Pelvic exam completed, poor coordination of layers. Increased tone R OI and coccygeus which were treated and she will monitor how she feels. Needling performed to pectineus and glut med and glut med strengthening initiated. Will be progressed to functional positions at next session.  Kamilah Is progressing well towards her goals.   Pt prognosis is Excellent.     Pt will continue to benefit from skilled outpatient physical therapy to address the deficits listed in the problem list box on initial evaluation, provide pt/family education and to maximize pt's level of independence in the home and community environment.     Pt's spiritual, cultural and educational needs considered and pt agreeable to plan of care and goals.     Anticipated barriers to physical therapy: none    Goals: Short Term Goals: 4 weeks   Pt indep in HEP  Pt indep in fhip self correction technique for decreased strength  Pt demo pelvic floor mm strength at least 3/5 for improved continence and support of pelvic organs        Long Term Goals: 8 weeks   Pt indep in progressive HEP  Pt reports 0 episodes of leakage in at least 2 weeks for improved ADL participation  Pt demo pelvic floor mm strength at least 4/5 for improved continence and support of pelvic organs  R hip IR ROM increased to 35 degree for ROM WFL  R hip glut med  strength increased to 4/5 for improved pelvic girdle support        Plan      Plan of care Certification: 11/10/2023 to 2/8/24.     Outpatient Physical Therapy 1 times weekly for 12 weeks to include the following interventions: therapeutic exercises, therapeutic activity, neuromuscular re-education, manual therapy, patient/family education and self care/home management     Next visit: glut med and tra > fx positions, needling, OI release, check PFME    Marlene Kat, PT

## 2023-11-28 ENCOUNTER — CLINICAL SUPPORT (OUTPATIENT)
Dept: REHABILITATION | Facility: OTHER | Age: 53
End: 2023-11-28
Payer: COMMERCIAL

## 2023-11-28 DIAGNOSIS — R29.898 WEAKNESS OF RIGHT HIP: ICD-10-CM

## 2023-11-28 DIAGNOSIS — M79.18 MYALGIA OF PELVIC FLOOR: Primary | ICD-10-CM

## 2023-11-28 PROCEDURE — 97112 NEUROMUSCULAR REEDUCATION: CPT

## 2023-11-28 PROCEDURE — 97140 MANUAL THERAPY 1/> REGIONS: CPT

## 2023-11-28 NOTE — PROGRESS NOTES
Pelvic Health Physical Therapy   Treatment Note     Name: Kamilah Goldstein [facility] Number: [id number]    Therapy Diagnosis:   Encounter Diagnoses   Name Primary?    Myalgia of pelvic floor Yes    Weakness of right hip      Physician: Dorita Holley DO    Visit Date: 11/28/2023    Physician Orders: PT Eval and Treat  Medical Diagnosis from Referral: N39.3 (ICD-10-CM) - CELE (stress urinary incontinence, female)  Evaluation Date: 11/10/2023  Authorization Period Expiration: 20 visits  Plan of Care Expiration: 2/8/24  Visit # / Visits authorized: 3/ 20  Cancelled Visits: 0  No Show Visits: 0    FOTO: not completed    Time In: 1125 (pt arrived late)  Time Out: 1205  Total Billable Time: 40 minutes    Precautions: Standard    Subjective     Pt reports: Hasn't needed to do hip reset recently, though may need it right now. Tailbone sensation still there, I/M. Swam a lot and treaded water and was noticing it.   She was compliant with home exercise program.  Response to previous treatment: felt fine  Functional change: no change    Pain: 0/10  Location:     Objective   Pt verbally consents to intravaginal treatment today.  Signed consent form already on file.    Kamilah received the following manual therapy techniques: to develop flexibility and extensibility for 25 minutes including:   Trigger point/myofascial release of R coccygeus and R OI  ART R iliacus mm and R QL  FDN R pectineus and glut med    Kamilah participated in neuromuscular re-education activities to develop Coordination, Control, and Down training for 15 minutes including:  Pelvic contractions focus on layers  Clamshells without QL activation  Standing hip abd.er, 3 sets to fatigue    Kamilah participated in dynamic functional therapeutic activities to improve functional performance for 00 minutes, including:  POC, monitoring how pelvis responds , body mechanics     Home Exercises Provided and Patient Education Provided     Education provided:   -  anatomy/physiology of pelvic floor, posture/body mechanices, and diaphragmatic breathing  Discussed progression of plan of care with patient; educated pt in activity modification; reviewed HEP with pt. Pt demonstrated and verbalized understanding of all instruction and was provided with a handout of HEP (see Patient Instructions).    Written Home Exercises Provided: Patient instructed to cont prior HEP.  Exercises were reviewed and Kamilah was able to demonstrate them prior to the end of the session.  Kamilah demonstrated good  understanding of the education provided.     See EMR under Patient Instructions for exercises provided 11/22/2023.    Assessment     Performed myofascial release again much much less tension in mm. Progressed hip and core strengthening.  Kamilah Is progressing well towards her goals.   Pt prognosis is Excellent.     Pt will continue to benefit from skilled outpatient physical therapy to address the deficits listed in the problem list box on initial evaluation, provide pt/family education and to maximize pt's level of independence in the home and community environment.     Pt's spiritual, cultural and educational needs considered and pt agreeable to plan of care and goals.     Anticipated barriers to physical therapy: none    Goals: Short Term Goals: 4 weeks   Pt indep in HEP  Pt indep in fhip self correction technique for decreased strength  Pt demo pelvic floor mm strength at least 3/5 for improved continence and support of pelvic organs        Long Term Goals: 8 weeks   Pt indep in progressive HEP  Pt reports 0 episodes of leakage in at least 2 weeks for improved ADL participation  Pt demo pelvic floor mm strength at least 4/5 for improved continence and support of pelvic organs  R hip IR ROM increased to 35 degree for ROM WFL  R hip glut med strength increased to 4/5 for improved pelvic girdle support        Plan      Plan of care Certification: 11/10/2023 to 2/8/24.     Outpatient  Physical Therapy 1 times weekly for 12 weeks to include the following interventions: therapeutic exercises, therapeutic activity, neuromuscular re-education, manual therapy, patient/family education and self care/home management     Next visit: glut med and tra > fx positions, needling, OI release, check PFME    Marlene Kat, PT

## 2023-11-30 ENCOUNTER — CLINICAL SUPPORT (OUTPATIENT)
Dept: REHABILITATION | Facility: OTHER | Age: 53
End: 2023-11-30
Attending: OBSTETRICS & GYNECOLOGY
Payer: COMMERCIAL

## 2023-11-30 DIAGNOSIS — R29.898 WEAKNESS OF RIGHT HIP: ICD-10-CM

## 2023-11-30 DIAGNOSIS — M79.18 MYALGIA OF PELVIC FLOOR: Primary | ICD-10-CM

## 2023-11-30 PROCEDURE — 97140 MANUAL THERAPY 1/> REGIONS: CPT

## 2023-11-30 PROCEDURE — 97112 NEUROMUSCULAR REEDUCATION: CPT

## 2023-11-30 NOTE — PROGRESS NOTES
Pelvic Health Physical Therapy   Treatment Note     Name: Kamilah Goldstein [facility] Number: [id number]    Therapy Diagnosis:   No diagnosis found.    Physician: Dorita Holley DO    Visit Date: 11/30/2023    Physician Orders: PT Eval and Treat  Medical Diagnosis from Referral: N39.3 (ICD-10-CM) - CELE (stress urinary incontinence, female)  Evaluation Date: 11/10/2023  Authorization Period Expiration: 20 visits  Plan of Care Expiration: 2/8/24  Visit # / Visits authorized: 3/ 20  Cancelled Visits: 0  No Show Visits: 0    FOTO: not completed    Time In: 1125 (pt arrived late)  Time Out: 1205  Total Billable Time: 40 minutes    Precautions: Standard    Subjective     Pt reports: Hasn't needed to do hip reset recently, though may need it right now. Tailbone sensation still there, I/M. Swam a lot and treaded water and was noticing it.   She was compliant with home exercise program.  Response to previous treatment: felt fine  Functional change: no change    Pain: 0/10  Location:     Objective   Pt verbally consents to intravaginal treatment today.  Signed consent form already on file.    Kamilah received the following manual therapy techniques: to develop flexibility and extensibility for 15 minutes including:   ART R iliacus mm and R QL  FDN R glut med    Kamilah participated in neuromuscular re-education activities to develop Coordination, Control, and Down training for 45 minutes including:  Pelvic contractions focus on layers  Clamshells without QL activation  Standing hip abd.er, 3 sets to fatigue  TrA isolation > marching  Quadruped - fire hydrants    Kamilah participated in dynamic functional therapeutic activities to improve functional performance for 00 minutes, including:  POC, monitoring how pelvis responds , body mechanics     Home Exercises Provided and Patient Education Provided     Education provided:   - anatomy/physiology of pelvic floor, posture/body mechanices, and diaphragmatic breathing  Discussed  progression of plan of care with patient; educated pt in activity modification; reviewed HEP with pt. Pt demonstrated and verbalized understanding of all instruction and was provided with a handout of HEP (see Patient Instructions).    Written Home Exercises Provided: Patient instructed to cont prior HEP.  Exercises were reviewed and Kamilah was able to demonstrate them prior to the end of the session.  Kamilah demonstrated good  understanding of the education provided.     See EMR under Patient Instructions for exercises provided 11/22/2023.    Assessment     Progressed core stabilization. Assessed spinal ROM, fair.   Kamilah Is progressing well towards her goals.   Pt prognosis is Excellent.     Pt will continue to benefit from skilled outpatient physical therapy to address the deficits listed in the problem list box on initial evaluation, provide pt/family education and to maximize pt's level of independence in the home and community environment.     Pt's spiritual, cultural and educational needs considered and pt agreeable to plan of care and goals.     Anticipated barriers to physical therapy: none    Goals: Short Term Goals: 4 weeks   Pt indep in HEP  Pt indep in fhip self correction technique for decreased strength  Pt demo pelvic floor mm strength at least 3/5 for improved continence and support of pelvic organs        Long Term Goals: 8 weeks   Pt indep in progressive HEP  Pt reports 0 episodes of leakage in at least 2 weeks for improved ADL participation  Pt demo pelvic floor mm strength at least 4/5 for improved continence and support of pelvic organs  R hip IR ROM increased to 35 degree for ROM WFL  R hip glut med strength increased to 4/5 for improved pelvic girdle support        Plan      Plan of care Certification: 11/10/2023 to 2/8/24.     Outpatient Physical Therapy 1 times weekly for 12 weeks to include the following interventions: therapeutic exercises, therapeutic activity, neuromuscular  re-education, manual therapy, patient/family education and self care/home management     Next visit: glut med and tra > fx positions, needling, OI release, check PFME    Marlene Kat, PT

## 2023-12-04 RX ORDER — ROSUVASTATIN CALCIUM 20 MG/1
20 TABLET, COATED ORAL DAILY
Qty: 90 TABLET | Refills: 3 | Status: SHIPPED | OUTPATIENT
Start: 2023-12-04 | End: 2024-12-03

## 2023-12-05 ENCOUNTER — HOSPITAL ENCOUNTER (OUTPATIENT)
Dept: RADIOLOGY | Facility: HOSPITAL | Age: 53
Discharge: HOME | End: 2023-12-05
Attending: PSYCHIATRY & NEUROLOGY
Payer: COMMERCIAL

## 2023-12-05 ENCOUNTER — PATIENT MESSAGE (OUTPATIENT)
Dept: OBSTETRICS AND GYNECOLOGY | Facility: CLINIC | Age: 53
End: 2023-12-05
Payer: COMMERCIAL

## 2023-12-05 ENCOUNTER — CLINICAL SUPPORT (OUTPATIENT)
Dept: REHABILITATION | Facility: OTHER | Age: 53
End: 2023-12-05
Payer: COMMERCIAL

## 2023-12-05 ENCOUNTER — OFFICE VISIT (OUTPATIENT)
Dept: NEUROLOGY | Facility: CLINIC | Age: 53
End: 2023-12-05
Payer: COMMERCIAL

## 2023-12-05 DIAGNOSIS — F32.A ANXIETY AND DEPRESSION: ICD-10-CM

## 2023-12-05 DIAGNOSIS — M79.18 MYALGIA OF PELVIC FLOOR: Primary | ICD-10-CM

## 2023-12-05 DIAGNOSIS — R41.89 COGNITIVE CHANGES: Primary | ICD-10-CM

## 2023-12-05 DIAGNOSIS — R29.898 WEAKNESS OF RIGHT HIP: ICD-10-CM

## 2023-12-05 DIAGNOSIS — Z86.73 HISTORY OF CEREBELLAR STROKE: ICD-10-CM

## 2023-12-05 DIAGNOSIS — F41.9 ANXIETY AND DEPRESSION: ICD-10-CM

## 2023-12-05 PROCEDURE — 99499 UNLISTED E&M SERVICE: CPT | Mod: 95,,, | Performed by: CLINICAL NEUROPSYCHOLOGIST

## 2023-12-05 PROCEDURE — 97112 NEUROMUSCULAR REEDUCATION: CPT

## 2023-12-05 PROCEDURE — 96116 PR NEUROBEHAVIORAL STATUS EXAM BY PSYCH/PHYS: ICD-10-PCS | Mod: 95,,, | Performed by: CLINICAL NEUROPSYCHOLOGIST

## 2023-12-05 PROCEDURE — 93893 TCD STD ICR ART VEN-ART SHNT: CPT | Mod: 26,,, | Performed by: RADIOLOGY

## 2023-12-05 PROCEDURE — 99499 NO LOS: ICD-10-PCS | Mod: 95,,, | Performed by: CLINICAL NEUROPSYCHOLOGIST

## 2023-12-05 PROCEDURE — 96116 NUBHVL XM PHYS/QHP 1ST HR: CPT | Mod: 95,,, | Performed by: CLINICAL NEUROPSYCHOLOGIST

## 2023-12-05 PROCEDURE — 93893 US TCD ARTERIES EMBOLI DETECTION W/IV MICROBUBBLE INJ: ICD-10-PCS | Mod: 26,,, | Performed by: RADIOLOGY

## 2023-12-05 PROCEDURE — 97530 THERAPEUTIC ACTIVITIES: CPT

## 2023-12-05 PROCEDURE — 93893 TCD STD ICR ART VEN-ART SHNT: CPT | Mod: TC

## 2023-12-05 NOTE — PROGRESS NOTES
NEUROPSYCHOLOGICAL EVALUATION - CONFIDENTIAL    Referring Provider: Drew Juarez MD   Medical Necessity: Evaluate cognitive and emotional functioning, participate in treatment planning/management, and provide supportive therapy in the setting of history of cerebellar stroke  Date Conducted: 2023  Present At Visit: the patient  Billin/[id number] = 60 minutes  Referral Diagnoses: Z86.73 (ICD-10-CM) - History of cerebellar stroke   Consent: The patient expressed an understanding of the purpose of the evaluation and consented to all procedures. She additionally provided consent to allowing Reese Torres, PhD, a clinical neuropsychology fellow under the direct supervision of Dr. Landon, to be involved in her care. We discussed the limits of confidentiality and discussed an emergency plan.    Telemedicine Details:   The patient location is: [place], [place]  Visit type: Virtual visit with synchronous audio and video  Total time spent with patient: 60 minutes  Each patient to whom he or she provides medical services by telemedicine is: (1) informed of the relationship between the physician and patient and the respective role of any other health care provider with respect to management of the patient; and (2) notified that he or she may decline to receive medical services by telemedicine and may withdraw from such care at any time.    ASSESSMENT & PLAN:   Ms. Kamilah Romero is a 53 y.o., female with 16 years of education and pertinent medical history including COVID-19, migraines, and depression who was referred for a neuropsychological evaluation in the setting of history of cerebellar stroke.      Full report to follow completion of testing. Differential include normal cognitive aging vs mild neurocognitive disorder. Possible etiologies include her cerebrovascular health (risk factor of HLD and history of cerebellar stroke) and 2 episodes of COVID-19. Additional concern for impact of depression,  anxiety, and dizziness on cognition.      Problem List Items Addressed This Visit          Neuro    History of cerebellar stroke       Psychiatric    Anxiety and depression     Other Visit Diagnoses       Cognitive changes    -  Primary          Thank you for allowing us to assist in Ms. Kamilah Romero's care. If you have any questions, please contact Dr. Landon at [phone].    Reese Torres, PhD  Clinical Neuropsychology Fellow    Lydia Landon, PhD, ABPP  Board Certified Clinical Neuropsychologist  [facility]     CLINICAL INTERVIEW & RECORD REVIEW:     Cognitive Functioning   Cognitive screener: none  Previous evaluation(s): none  Onset & course of difficulty: Per medical records, the patient tested positive for COVID-19 in October 2020. She contracted COVID-19 again in January 2023 and had mild upper respiratory infection symptoms without fever that last approximately 10 days, followed by chronic dizziness, brain fog, and lightheadedness. She was not hospitalized for COVID and did not require supplemental oxygen. She was seen by neurology at [facility] in March 2023 MRI of the brain revealed a small lesion in the right cerebellar hemisphere and two smaller lesions in the right frontal white matter. She reported memory changes beginning in March 2023 or April 2023, with some improvement observed after undergoing 40 treatments of hyperbaric oxygen therapy (but not a full return to her baseline). She was seen by Dr. Drew Juarez in September 2023 and underwent an EEG in October (results were normal), and a neuropsychological evaluation was ordered due to her ongoing changes to cognition.   First symptom/problem observed: memory  Fluctuations: She reported fluctuations in early 2023 but feels cognition has been stable more recently.    Examples:   Attention/Working Memory/Executive Functioning: Organization has gotten more  difficult. She has not been able to multitask since March 2023/April 2023.  Processing Speed: slowed processing speed  Language: Some difficulty with word finding  Visuospatial: No changes reported   Learning & Memory: She reported her short-term memory has worsened over the last 8 months. She has difficulty retaining and recalling information and has noticed she is misplacing items (e.g., keys). She has to write information down in order to remember. She will forget what she is talking about in the middle of conversations.   Exacerbating factors: poor sleep and alcohol use make cognition worse.  Ameliorating factors: getting good sleep, not using alcohol, hyperbaric oxygen therapy improve cognition   Medication for cognition: none    Daily Functioning    Bathing: Independent and without difficulty  Dressing: Independent and without difficulty  Grooming: Independent and without difficulty  Toileting: Independent and without difficulty  Transferring: Independent and without difficulty.  Eating: Independent and without difficulty.    Finances: She manages the finances. She reported occassionally forgetting to pay her credit card statement bills but stated this is not a new problem.  Medication Mgmt: Independent and without difficulty  Driving: She reported difficulty driving at night and stated she has a hard time seeing potholes and does not trust her peripheral vision. She denied getting lost while driving.    Household Mgmt: Independent and without difficulty  Cooking/Meal Preparation: She has to double check herself when she cooks to make sure she is cooking right or double-checking ingredients. She stays in the kitchen while cooking now because she fears she will forget. Her  has taken on more of the cooking since she found out about the stroke.   Shopping: She and her  both manage   Appointment Mgmt: She manages her own appointments. She writes appointments down but sometimes writes the information  "wrong (e.g., date or time of appointment).  Employment: Independent and without difficulty     Psychiatric/Neuropsychiatric Symptoms   Mood: "I'm okay, tired"  Depression: She feels moodier and down related to her medical problems  Anila/Hypomania: no  Anxiety: no  Stress: no  Coping Mechanisms: cry, yoga, meditate, talk with people ( and friends)  Social Withdrawal: She has withdrawn more and is not going out as much. She is unsure if this is due to the weather getting cooler or something else.  Neurovegetative Sxs:  Appetite: No changes reported  Sleep: She sleeps about 8-9 hours per week. She reported in the last week she has slept 6-7 hours per night. No snoring, walking, or talking in sleep.    Energy: She takes naps during the day. She has low energy during the day and is tired more frequently.  Hallucinations: no  Delusional/Paranoid Thinking: no  Impulsivity: no  Obsessive/Compulsive Behaviors: no  Disinhibition: no  Irritability/Agitation: no  Aggression: no  Apathy/Indifference: Less motivated to exercise but she will still do it.  Other changes in personality: no    Physical Functioning   Tremor: no  Difficulty walking: no  Imbalance: Her balance is off due to dizziness.   Falls: no  Weakness: no  Trouble with fine motor movements: no  Lightheadedness: She feels dizzy often. She reported it feels like there is "swirling in her head...like she is bobbing."  Urinary Urgency: no  Sensory Sxs: Startles more easily with sounds and has more difficulty with vision when driving at night.  Pain: no  Physical Exercise Routine: She exercises 4-6 days per week doing yoga, weight-training, or swimming    RELEVANT HISTORY  This patient has a past medical history of COVID-19 long hauler manifesting chronic neurologic symptoms (2/16/2023), Depression, Lack of coordination (11/17/2022), and Migraine with aura and without status migrainosus, not intractable (7/1/2019).    Past Surgical History:   Procedure " "Laterality Date    BREAST BIOPSY Right 12/2020    BREAST BIOPSY Right 09/2020    VAGINAL DELIVERY         Neurological History    Headaches/Migraines: She had migraines in early adulthood that stopped after she changed her diet.   TBI: no  Seizures: no  Stroke: Brain MRI in March 2023 revealed right cerebellar infarct. She had no symptoms.   Tumor: no  Previous Episodes of Delirium: no  Movement Disorder: no  CNS Infection: no  Other: no    Neurodiagnostics     MRI Brain c/s Contrast   Date of Service: 03/30/2023  Impression  There are a few punctate foci of abnormal white matter signal intensity which are nonspecific but likely microvascular changes. There is a small area of encephalomalacia involving the right posterior lateral cerebellar hemisphere consistent with an area of chronic infraction. This measures approximately 13 x 6 mm.     EEG  Date of Service: 10/4/2023  Impression:   This is a normal awake and asleep routine EEG. There are no prominent focal findings, no epileptiform discharges, and no electrographic seizures. Of note, a normal EEG does not rule out the diagnosis of epilepsy. Clinical correlation is advised.    Pertinent Lab Work     Lab Results   Component Value Date    LKNDVPGD05 570 10/04/2023     No results found for: "RPR"  No results found for: "FOLATE"  Lab Results   Component Value Date    TSH 1.257 12/14/2023     Lab Results   Component Value Date    HGBA1C 5.5 12/14/2023     No results found for: "HIV1X2", "[id number]"    Medications     Current Outpatient Medications:     EScitalopram oxalate (LEXAPRO) 20 MG tablet, Take 1 tablet (20 mg total) by mouth once daily., Disp: 90 tablet, Rfl: 0    rosuvastatin (CRESTOR) 20 MG tablet, Take 1 tablet (20 mg total) by mouth once daily., Disp: 90 tablet, Rfl: 3     Psychiatric History   Prior Diagnoses: She reported none. Per chart, she has a history of anxiety and depression   History of Trauma/Abuse: no  History of Suicide Attempts: no  Current " Ideation, Intention, or Plan: no  Homicidal Ideation: no  Medication(s): She is prescribed Lexapro for mood  Hospitalization(s): no  Psychotherapy/Counseling: She has been in psychotherapy on and off since age 18. She recently re-engaged in psychotherapy 2 weeks ago.   Other: no    Substance Use History   Ms. Romero  reports that she quit smoking about 5 years ago. Her smoking use included cigarettes. She has never used smokeless tobacco. She reports current alcohol use of about 7.0 standard drinks of alcohol per week. She reports that she does not use drugs. She reported drinking alcohol (wine or martini) social. She denied use of tobacco or other substance.   History of abuse/overuse: no     Family Neurological & Psychiatric History     family history includes Breast cancer in her paternal grandmother; Cancer in her maternal grandfather.  Neurologic: Paternal grandmother had dementia (diagnosed age 70)   Psychiatric: Maternal grandmother was diagnosed with paranoid schizophrenia    Development  Education   Born & raised: Born in [place], raised in [place]  Prenatal and  development: [facility]  Developmental milestones: [facility]  Language Acquisition: English first language  Level Attained: Bachelor's in psychology  Learning/Attention/Behavior Difficulties: no  Repeated Grade(s): no  Typical Grades: A/B student; occasionally made Cs        Occupation  Social    Service: no  Occupational Status: Self-employed  Primary Occupation:  for 20-25 years  Family Status:  14 years; 1 child (son)  Support System: , friends  Hobbies/Activities: gardening, art, writing, graphic design, reading, taking care of    Current Living Situation: She lives in a house with her  and son      OBJECTIVE:     MENTAL STATUS AND OBSERVATIONS:   Appearance: Casually dressed and adequate grooming/hygiene.   Alertness: Attentive and alert.   Orientation:   With the exception of being off by one  "when stating the date (4th when it was the 5th), she was O x 4   Gait:  Unable to assess   Psychomotor:  Unable to assess   Handedness:  Right   Vision & Hearing:  Adequate for session   Speech/language: Normal in rate, rhythm, tone, and volume. No significant word finding difficulty observed. Comprehension was normal.   Mood/Affect:  The patients stated mood was "I'm okay" Affect was congruent with stated mood.    Interpersonal Behavior:  Rapport was quickly and easily established    Suicidality/Homicidality: Denied   Hallucinations/Delusions:  None evidenced or endorsed   Thought Content: Logical   Thought Processes: Goal-directed   Insight & Judgment:  Appropriate   Participation in Interview:  Full     PROCEDURES/TESTS ADMINISTERED: Performed a review of pertinent medical records, reviewed limits to confidentiality, conducted a clinical interview, and explained procedures.  "

## 2023-12-05 NOTE — PROGRESS NOTES
Procedure completed and pt tolerated well / bubble scan done x 2 and pt tolerated well / pt sitting up and informed to follow up with Neuro / IV removed and dressing applied / pt ambulatory and escorted by Poppy to lobby

## 2023-12-05 NOTE — PROGRESS NOTES
Pelvic Health Physical Therapy   Treatment Note     Name: Kamilah Goldstein [facility] Number: [id number]    Therapy Diagnosis:   No diagnosis found.    Physician: Dorita Holley DO    Visit Date: 12/5/2023    Physician Orders: PT Eval and Treat  Medical Diagnosis from Referral: N39.3 (ICD-10-CM) - CELE (stress urinary incontinence, female)  Evaluation Date: 11/10/2023  Authorization Period Expiration: 20 visits  Plan of Care Expiration: 2/8/24  Visit # / Visits authorized: 4/ 20  Cancelled Visits: 0  No Show Visits: 0    FOTO: not completed    Time In: 900  Time Out: 945  Total Billable Time: 45 minutes    Precautions: Standard    Subjective     Pt reports: Things have been holding, hasn't felt posterior hip pain.   She was compliant with home exercise program.  Response to previous treatment: felt fine  Functional change: no change    Pain: 0/10  Location:     Objective   Pt verbally consents to intravaginal treatment today.  Signed consent form already on file.    Kamilah received the following manual therapy techniques: to develop flexibility and extensibility for 00 minutes including:   Not performed today    Kamilah participated in neuromuscular re-education activities to develop Coordination, Control, and Down training for 30 minutes including:  Review and video of exercises with VC:  Pelvic contractions focus on layers  Clamshells without QL activation  Standing hip abd.er, 3 sets to fatigue  TrA isolation > marching  Quadruped - fire hydrants, hip CARs    Kamilah participated in dynamic functional therapeutic activities to improve functional performance for 15 minutes, including:  POC, explanation of exercises and progressions    Home Exercises Provided and Patient Education Provided     Education provided:   - anatomy/physiology of pelvic floor, posture/body mechanices, and diaphragmatic breathing  Discussed progression of plan of care with patient; educated pt in activity modification; reviewed HEP with pt.  Pt demonstrated and verbalized understanding of all instruction and was provided with a handout of HEP (see Patient Instructions).    Written Home Exercises Provided: Patient instructed to cont prior HEP.  Exercises were reviewed and Kamilah was able to demonstrate them prior to the end of the session.  Kamilah demonstrated good  understanding of the education provided.     See EMR under Patient Instructions for exercises provided 11/22/2023.    Assessment     Pt progressing very well. Hip is holding well and has not had pain in several days. At this time most important thing is to continue with strengthening exercises to assist with support of hip girdle. Exercises reviewed and videos made to increase compliance at home as she has memory issues. She will work on for a week and return at which time progressions will be made. Decreased stability noted through RLE with exercises and she will monitor for that.  Kamilah Is progressing well towards her goals.   Pt prognosis is Excellent.     Pt will continue to benefit from skilled outpatient physical therapy to address the deficits listed in the problem list box on initial evaluation, provide pt/family education and to maximize pt's level of independence in the home and community environment.     Pt's spiritual, cultural and educational needs considered and pt agreeable to plan of care and goals.     Anticipated barriers to physical therapy: none    Goals: Short Term Goals: 4 weeks   Pt indep in HEP  Pt indep in fhip self correction technique for decreased strength  Pt demo pelvic floor mm strength at least 3/5 for improved continence and support of pelvic organs        Long Term Goals: 8 weeks   Pt indep in progressive HEP  Pt reports 0 episodes of leakage in at least 2 weeks for improved ADL participation  Pt demo pelvic floor mm strength at least 4/5 for improved continence and support of pelvic organs  R hip IR ROM increased to 35 degree for ROM WFL  R hip glut med  strength increased to 4/5 for improved pelvic girdle support        Plan      Plan of care Certification: 11/10/2023 to 2/8/24.     Outpatient Physical Therapy 1 times weekly for 12 weeks to include the following interventions: therapeutic exercises, therapeutic activity, neuromuscular re-education, manual therapy, patient/family education and self care/home management     Next visit: SL bridge, ball roll to wall    Marlene Kat, GABBY

## 2023-12-05 NOTE — PROGRESS NOTES
Pt escorted to U/S room for procedure / nurse and Tech introduced self and review with pt plan of care as well as medication and allergies / pt question answered / IV established and ready for U/S

## 2023-12-07 ENCOUNTER — OFFICE VISIT (OUTPATIENT)
Dept: NEUROLOGY | Facility: CLINIC | Age: 53
End: 2023-12-07
Payer: COMMERCIAL

## 2023-12-07 VITALS
HEIGHT: 64 IN | HEART RATE: 75 BPM | DIASTOLIC BLOOD PRESSURE: 81 MMHG | SYSTOLIC BLOOD PRESSURE: 125 MMHG | WEIGHT: 145 LBS | BODY MASS INDEX: 24.75 KG/M2

## 2023-12-07 DIAGNOSIS — E78.5 DYSLIPIDEMIA: ICD-10-CM

## 2023-12-07 DIAGNOSIS — Z86.73 HISTORY OF CEREBELLAR STROKE: Primary | ICD-10-CM

## 2023-12-07 DIAGNOSIS — G43.009 MIGRAINE WITHOUT AURA AND WITHOUT STATUS MIGRAINOSUS, NOT INTRACTABLE: ICD-10-CM

## 2023-12-07 DIAGNOSIS — F09 COGNITIVE DYSFUNCTION: ICD-10-CM

## 2023-12-07 PROCEDURE — 1159F MED LIST DOCD IN RCRD: CPT | Mod: CPTII,S$GLB,, | Performed by: PSYCHIATRY & NEUROLOGY

## 2023-12-07 PROCEDURE — 3079F DIAST BP 80-89 MM HG: CPT | Mod: CPTII,S$GLB,, | Performed by: PSYCHIATRY & NEUROLOGY

## 2023-12-07 PROCEDURE — 3008F PR BODY MASS INDEX (BMI) DOCUMENTED: ICD-10-PCS | Mod: CPTII,S$GLB,, | Performed by: PSYCHIATRY & NEUROLOGY

## 2023-12-07 PROCEDURE — 3079F PR MOST RECENT DIASTOLIC BLOOD PRESSURE 80-89 MM HG: ICD-10-PCS | Mod: CPTII,S$GLB,, | Performed by: PSYCHIATRY & NEUROLOGY

## 2023-12-07 PROCEDURE — 99999 PR PBB SHADOW E&M-EST. PATIENT-LVL III: ICD-10-PCS | Mod: PBBFAC,,, | Performed by: PSYCHIATRY & NEUROLOGY

## 2023-12-07 PROCEDURE — 99999 PR PBB SHADOW E&M-EST. PATIENT-LVL III: CPT | Mod: PBBFAC,,, | Performed by: PSYCHIATRY & NEUROLOGY

## 2023-12-07 PROCEDURE — 3074F SYST BP LT 130 MM HG: CPT | Mod: CPTII,S$GLB,, | Performed by: PSYCHIATRY & NEUROLOGY

## 2023-12-07 PROCEDURE — 3008F BODY MASS INDEX DOCD: CPT | Mod: CPTII,S$GLB,, | Performed by: PSYCHIATRY & NEUROLOGY

## 2023-12-07 PROCEDURE — 3074F PR MOST RECENT SYSTOLIC BLOOD PRESSURE < 130 MM HG: ICD-10-PCS | Mod: CPTII,S$GLB,, | Performed by: PSYCHIATRY & NEUROLOGY

## 2023-12-07 PROCEDURE — 1159F PR MEDICATION LIST DOCUMENTED IN MEDICAL RECORD: ICD-10-PCS | Mod: CPTII,S$GLB,, | Performed by: PSYCHIATRY & NEUROLOGY

## 2023-12-07 PROCEDURE — 99215 PR OFFICE/OUTPT VISIT, EST, LEVL V, 40-54 MIN: ICD-10-PCS | Mod: S$GLB,,, | Performed by: PSYCHIATRY & NEUROLOGY

## 2023-12-07 PROCEDURE — 99215 OFFICE O/P EST HI 40 MIN: CPT | Mod: S$GLB,,, | Performed by: PSYCHIATRY & NEUROLOGY

## 2023-12-07 RX ORDER — RIMEGEPANT SULFATE 75 MG/75MG
75 TABLET, ORALLY DISINTEGRATING ORAL ONCE AS NEEDED
Qty: 10 TABLET | Refills: 2 | Status: SHIPPED | OUTPATIENT
Start: 2023-12-07 | End: 2023-12-07

## 2023-12-07 NOTE — PROGRESS NOTES
Neurology Clinic Follow-Up Note    Impression:  R cerebellar infarct, silent:  developmental in DDx.  If stroke, etiology ESUS.  The lesion does not explain her chronic, episodic vertigo and cognitive dysfunction.  Mild cognitive dysfunction: suspected long-COVID.  Depression, sleep disorder in DDx.   Remote migraine without aura  Episodic vertigo:  follows with Dr. Wagner.  If vestibulopathy not felt likely, will consider treatment as vestibular migraine.  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  Continue to re-evaluate at f/u.    Stroke risk factors: stroke, leukoaraiosis, ex-tobacco (quit '18), dyslipidemia    Plan:  Continue ASA 81mg/d  Continue rosuvastatin 20mg/d; recheck FLP/LFTs in about 6 weeks (target LDL <100mg/dL)  Avoid triptans, if possible, given #1 above  Nurtec prn (re-prescribed)  F/U with Dr. Wagner and vestibular therapy  Continue Lexapro 20mg/d   Neuropsych eval as scheduled (Dec 11 appt)  Re-evaluate pupils at f/u  F/U on TCD report which is pending; if suggestive of PFO, will not recommend closure given nl thrombophilia eval  I will provide results of the above testing [facility]  RTC 3 months    Problem List Items Addressed This Visit          1 - High    History of cerebellar stroke - Primary    Relevant Medications    rimegepant (NURTEC) 75 mg odt     Other Visit Diagnoses       Cognitive dysfunction        Migraine without aura and without status migrainosus, not intractable        Relevant Medications    rimegepant (NURTEC) 75 mg odt    Dyslipidemia        Relevant Orders    LIPID PANEL    HEPATIC FUNCTION PANEL            Patient returns for follow-up of above.   TCD bubble study: report pending  Thrombophilia eval negative  30 day cardiac event monitor negative  No stroke symptoms.    She remains fatigued  Has about 2-3 episodes/month of vertigo lasting hours (without HA or associated focal symptoms).  She had spells while wearing  "event monitor.    Abortives   Imitrex, Maxalt  Nurtec (not tried yet)    Prophylactics   None      Running history:  CC:  cerebellar stroke    HPI:  52 y/o WF self-referred for evaluation of prior stroke.  She had COVID in '21 or '22 and Jan '23.  Subsequently, she developed "brain fog" and vertigo that is constant felt due to long-COVID.  Cognitive issues (short-term memory/attention) have improved over the last several months, associated with HBO.   There is not a history of clear acute onset of stroke-like symptoms, particularly referable to the cerebellum.  A neurologist, Dr. Jama, ordered a CTA which showed an area of R cerebellar encephalomalacia which was confirmed with MRI.    MRI brain (by report) 3/30/23 - R cerebellar encephalomalacia; few WM hyperintensities  CTA (by report) 4/25/23 - nl  Echo (by report) 6/26/23 - nl    She is seeing Dr. Key for vertigo; these records are not available.  No history of head/neck trauma.  No unexplained neck pain. No hx DVT/PE.  One miscarriage.   Father on Coumadin for DVTs.         Outpatient Medications Marked as Taking for the 12/7/23 encounter (Office Visit) with Drew Juarez MD   Medication Sig Dispense Refill    EScitalopram oxalate (LEXAPRO) 20 MG tablet Take 1 tablet (20 mg total) by mouth once daily. 90 tablet 0    rosuvastatin (CRESTOR) 20 MG tablet Take 1 tablet (20 mg total) by mouth once daily. 90 tablet 3       /81 (BP Location: Left arm, Patient Position: Sitting, BP Method: Medium (Automatic))   Pulse 75   Ht 5' 4" (1.626 m)   Wt 65.8 kg (145 lb)   BMI 24.89 kg/m²   Well-developed, well nourished.  Awake, alert and oriented.  Language is normal.  1pupil 1mm larger than L with no change in dark; no ptosis,  EOMF without nystagmus, facial movements intact.  Gait is steady.      Lab Results   Component Value Date    LDLCALC 155.4 09/12/2023     41 mins chart review, face to face, documentation    Drew Juarez MD    "

## 2023-12-08 ENCOUNTER — PATIENT MESSAGE (OUTPATIENT)
Dept: NEUROLOGY | Facility: CLINIC | Age: 53
End: 2023-12-08
Payer: COMMERCIAL

## 2023-12-11 ENCOUNTER — PATIENT MESSAGE (OUTPATIENT)
Dept: NEUROLOGY | Facility: CLINIC | Age: 53
End: 2023-12-11
Payer: COMMERCIAL

## 2023-12-14 ENCOUNTER — OFFICE VISIT (OUTPATIENT)
Dept: OBSTETRICS AND GYNECOLOGY | Facility: CLINIC | Age: 53
End: 2023-12-14
Payer: COMMERCIAL

## 2023-12-14 ENCOUNTER — PATIENT MESSAGE (OUTPATIENT)
Dept: OBSTETRICS AND GYNECOLOGY | Facility: CLINIC | Age: 53
End: 2023-12-14

## 2023-12-14 ENCOUNTER — LAB VISIT (OUTPATIENT)
Dept: LAB | Facility: OTHER | Age: 53
End: 2023-12-14
Attending: OBSTETRICS & GYNECOLOGY
Payer: COMMERCIAL

## 2023-12-14 VITALS
SYSTOLIC BLOOD PRESSURE: 100 MMHG | WEIGHT: 154.31 LBS | HEIGHT: 64 IN | BODY MASS INDEX: 26.34 KG/M2 | DIASTOLIC BLOOD PRESSURE: 60 MMHG

## 2023-12-14 DIAGNOSIS — R53.83 FATIGUE, UNSPECIFIED TYPE: ICD-10-CM

## 2023-12-14 DIAGNOSIS — R68.82 LOW LIBIDO: ICD-10-CM

## 2023-12-14 DIAGNOSIS — Z01.419 ENCOUNTER FOR ANNUAL ROUTINE GYNECOLOGICAL EXAMINATION: Primary | ICD-10-CM

## 2023-12-14 LAB
ESTIMATED AVG GLUCOSE: 111 MG/DL (ref 68–131)
ESTRADIOL SERPL-MCNC: 52 PG/ML
FSH SERPL-ACNC: 9.52 MIU/ML
HBA1C MFR BLD: 5.5 % (ref 4–5.6)
LH SERPL-ACNC: 6.5 MIU/ML
PROGEST SERPL-MCNC: 2.1 NG/ML
TESTOST SERPL-MCNC: 13 NG/DL (ref 5–73)
TSH SERPL DL<=0.005 MIU/L-ACNC: 1.26 UIU/ML (ref 0.4–4)

## 2023-12-14 PROCEDURE — 84144 ASSAY OF PROGESTERONE: CPT | Performed by: OBSTETRICS & GYNECOLOGY

## 2023-12-14 PROCEDURE — 99213 PR OFFICE/OUTPT VISIT, EST, LEVL III, 20-29 MIN: ICD-10-PCS | Mod: 25,S$GLB,, | Performed by: OBSTETRICS & GYNECOLOGY

## 2023-12-14 PROCEDURE — 3074F SYST BP LT 130 MM HG: CPT | Mod: CPTII,S$GLB,, | Performed by: OBSTETRICS & GYNECOLOGY

## 2023-12-14 PROCEDURE — 99396 PREV VISIT EST AGE 40-64: CPT | Mod: S$GLB,,, | Performed by: OBSTETRICS & GYNECOLOGY

## 2023-12-14 PROCEDURE — 1159F PR MEDICATION LIST DOCUMENTED IN MEDICAL RECORD: ICD-10-PCS | Mod: CPTII,S$GLB,, | Performed by: OBSTETRICS & GYNECOLOGY

## 2023-12-14 PROCEDURE — 3008F BODY MASS INDEX DOCD: CPT | Mod: CPTII,S$GLB,, | Performed by: OBSTETRICS & GYNECOLOGY

## 2023-12-14 PROCEDURE — 83002 ASSAY OF GONADOTROPIN (LH): CPT | Performed by: OBSTETRICS & GYNECOLOGY

## 2023-12-14 PROCEDURE — 99999 PR PBB SHADOW E&M-EST. PATIENT-LVL III: ICD-10-PCS | Mod: PBBFAC,,, | Performed by: OBSTETRICS & GYNECOLOGY

## 2023-12-14 PROCEDURE — 99213 OFFICE O/P EST LOW 20 MIN: CPT | Mod: 25,S$GLB,, | Performed by: OBSTETRICS & GYNECOLOGY

## 2023-12-14 PROCEDURE — 83001 ASSAY OF GONADOTROPIN (FSH): CPT | Performed by: OBSTETRICS & GYNECOLOGY

## 2023-12-14 PROCEDURE — 83036 HEMOGLOBIN GLYCOSYLATED A1C: CPT | Performed by: OBSTETRICS & GYNECOLOGY

## 2023-12-14 PROCEDURE — 82670 ASSAY OF TOTAL ESTRADIOL: CPT | Performed by: OBSTETRICS & GYNECOLOGY

## 2023-12-14 PROCEDURE — 3008F PR BODY MASS INDEX (BMI) DOCUMENTED: ICD-10-PCS | Mod: CPTII,S$GLB,, | Performed by: OBSTETRICS & GYNECOLOGY

## 2023-12-14 PROCEDURE — 36415 COLL VENOUS BLD VENIPUNCTURE: CPT | Performed by: OBSTETRICS & GYNECOLOGY

## 2023-12-14 PROCEDURE — 99396 PR PREVENTIVE VISIT,EST,40-64: ICD-10-PCS | Mod: S$GLB,,, | Performed by: OBSTETRICS & GYNECOLOGY

## 2023-12-14 PROCEDURE — 99999 PR PBB SHADOW E&M-EST. PATIENT-LVL III: CPT | Mod: PBBFAC,,, | Performed by: OBSTETRICS & GYNECOLOGY

## 2023-12-14 PROCEDURE — 84403 ASSAY OF TOTAL TESTOSTERONE: CPT | Performed by: OBSTETRICS & GYNECOLOGY

## 2023-12-14 PROCEDURE — 84443 ASSAY THYROID STIM HORMONE: CPT | Performed by: OBSTETRICS & GYNECOLOGY

## 2023-12-14 PROCEDURE — 3074F PR MOST RECENT SYSTOLIC BLOOD PRESSURE < 130 MM HG: ICD-10-PCS | Mod: CPTII,S$GLB,, | Performed by: OBSTETRICS & GYNECOLOGY

## 2023-12-14 PROCEDURE — 3078F PR MOST RECENT DIASTOLIC BLOOD PRESSURE < 80 MM HG: ICD-10-PCS | Mod: CPTII,S$GLB,, | Performed by: OBSTETRICS & GYNECOLOGY

## 2023-12-14 PROCEDURE — 3078F DIAST BP <80 MM HG: CPT | Mod: CPTII,S$GLB,, | Performed by: OBSTETRICS & GYNECOLOGY

## 2023-12-14 PROCEDURE — 1159F MED LIST DOCD IN RCRD: CPT | Mod: CPTII,S$GLB,, | Performed by: OBSTETRICS & GYNECOLOGY

## 2023-12-14 PROCEDURE — 1160F PR REVIEW ALL MEDS BY PRESCRIBER/CLIN PHARMACIST DOCUMENTED: ICD-10-PCS | Mod: CPTII,S$GLB,, | Performed by: OBSTETRICS & GYNECOLOGY

## 2023-12-14 PROCEDURE — 1160F RVW MEDS BY RX/DR IN RCRD: CPT | Mod: CPTII,S$GLB,, | Performed by: OBSTETRICS & GYNECOLOGY

## 2023-12-14 NOTE — PROGRESS NOTES
SUBJECTIVE:     Chief Complaint: Gynecologic Exam and problem visit       History of Present Illness:  Annual Exam  Patient presents for annual exam.   She c/o low libido and fatigue today. Had cerebellar stroke this year. Seeing neuro for this.   LMP: 23, cycles starting to space out  She denies any vd, vb, dyspareunia, dysuria, depression, anxiety.  Last pap was in  and was neg. HPV neg.  Birth Control: none  declines STD testing.     GYN screening history:  denies  Mammogram history: neg   Colonoscopy history: neg, due   Dexa history: none    FH:   Breast cancer: patenral GM  Colon cancer: none  Ovarian cancer: none    Review of patient's allergies indicates:  No Known Allergies    Past Medical History:   Diagnosis Date    COVID-19 long hauler manifesting chronic neurologic symptoms 2023    Depression     stable    Lack of coordination 2022    Migraine with aura and without status migrainosus, not intractable 2019    Plan for Medrol for status migrainosus Sumatriptan 50mg prn Has tried Maxalt, Tylenol     Past Surgical History:   Procedure Laterality Date    BREAST BIOPSY Right 2020    BREAST BIOPSY Right 2020    VAGINAL DELIVERY       OB History          1    Para   1    Term   1            AB        Living             SAB        IAB        Ectopic        Multiple        Live Births                   Family History   Problem Relation Age of Onset    Breast cancer Paternal Grandmother         unk age of onset    Cancer Maternal Grandfather         stomach ca (possibly)    Ovarian cancer Neg Hx      Social History     Tobacco Use    Smoking status: Former     Current packs/day: 0.00     Types: Cigarettes     Quit date: 10/29/2018     Years since quittin.1    Smokeless tobacco: Never   Substance Use Topics    Alcohol use: Yes     Alcohol/week: 7.0 standard drinks of alcohol     Types: 7 Glasses of wine per week    Drug use: Never       Current Outpatient  Medications   Medication Sig    EScitalopram oxalate (LEXAPRO) 20 MG tablet Take 1 tablet (20 mg total) by mouth once daily.    rosuvastatin (CRESTOR) 20 MG tablet Take 1 tablet (20 mg total) by mouth once daily.     No current facility-administered medications for this visit.       Review of Systems:  GENERAL: No fever, chills, fatigability or weight loss.  CARDIOVASCULAR: No chest pain. No palpitations.  RESPIRATORY: No SOB, no wheezing.  BREAST: Denies pain. No lumps. No discharge.  VULVAR: No pain, no lesions and no itching.  VAGINAL: No relaxation, no itching, no discharge, no abnormal bleeding and no lesions.  ABDOMEN: No abdominal pain. Denies nausea. Denies vomiting. No diarrhea. No constipation  URINARY: No incontinence, no nocturia, no frequency and no dysuria.  NEUROLOGICAL: No headaches. No vision changes.       OBJECTIVE:     Vitals:    12/14/23 0913   BP: 100/60       Patient verbally consents to pelvic and breast exams.  Physical Exam:  Gen: NAD, well developed, well-nourished  HEENT: Normocephalic, atraumatic  Eyes: EOM nl, conjuntivae normal  Neck: ROM normal, no thyromegaly  Respiratory: Effort normal   Abd: soft, nontender, no masses palpated  Breast: Normal bilaterally, no masses, lesions or tenderness. No nipple discharge on expression, no lymphadenopathy bilaterally.  SSE:  Vulva: no lesions or rashes  Cervix: No lesions noted, nonfriable, no vaginal discharge or vaginal bleeding noted  BME:   Cervix: No CMT  Adnexa: nl bilaterally, no masses or fullness palpated  Uterus: normal, nonenlarged  Musculoskeletal: normal ROM  Neuro: alert, AAOx3  Skin: warm and dry  Psych: mood/affect nl, behavior normal, judgement normal, thought content normal        ASSESSMENT:       ICD-10-CM ICD-9-CM    1. Encounter for annual routine gynecological examination  Z01.419 V72.31       2. Low libido  R68.82 799.81 Follicle Stimulating Hormone      TSH      Estradiol      Progesterone      Hemoglobin A1C       TESTOSTERONE      LUTEINIZING HORMONE      3. Fatigue, unspecified type  R53.83 780.79 TSH      Estradiol      Progesterone      Hemoglobin A1C      TESTOSTERONE      LUTEINIZING HORMONE             Plan:      Kamilah was seen today for gynecologic exam.    Diagnoses and all orders for this visit:    Encounter for annual routine gynecological examination    Low libido  -     Follicle Stimulating Hormone; Future  -     TSH; Future  -     Estradiol; Future  -     Progesterone; Future  -     Hemoglobin A1C; Future  -     TESTOSTERONE; Future  -     LUTEINIZING HORMONE; Future    Fatigue, unspecified type  -     TSH; Future  -     Estradiol; Future  -     Progesterone; Future  -     Hemoglobin A1C; Future  -     TESTOSTERONE; Future  -     LUTEINIZING HORMONE; Future        Orders Placed This Encounter   Procedures    Follicle Stimulating Hormone    TSH    Estradiol    Progesterone    Hemoglobin A1C    TESTOSTERONE    LUTEINIZING HORMONE       Follow up in one year for annual, or prn.    Julie R Jeansonne

## 2023-12-14 NOTE — PROGRESS NOTES
CC: fatigue, low libido    Kamilah Romero is a 53 y.o. female  presents with complaint of above. Had cerebellar stroke this year and thinks this may have something to do with this but wants hormones checked as well. Has younger partner and so wants to look into libido. She has had low libido for a long time.     Past Medical History:   Diagnosis Date    COVID-19 long hauler manifesting chronic neurologic symptoms 2023    Depression     stable    Lack of coordination 2022    Migraine with aura and without status migrainosus, not intractable 2019    Plan for Medrol for status migrainosus Sumatriptan 50mg prn Has tried Maxalt, Tylenol     Past Surgical History:   Procedure Laterality Date    BREAST BIOPSY Right 2020    BREAST BIOPSY Right 2020    VAGINAL DELIVERY         Current Outpatient Medications:     EScitalopram oxalate (LEXAPRO) 20 MG tablet, Take 1 tablet (20 mg total) by mouth once daily., Disp: 90 tablet, Rfl: 0    rosuvastatin (CRESTOR) 20 MG tablet, Take 1 tablet (20 mg total) by mouth once daily., Disp: 90 tablet, Rfl: 3  Review of patient's allergies indicates:  No Known Allergies  Social History     Tobacco Use    Smoking status: Former     Current packs/day: 0.00     Types: Cigarettes     Quit date: 10/29/2018     Years since quittin.1    Smokeless tobacco: Never   Substance Use Topics    Alcohol use: Yes     Alcohol/week: 7.0 standard drinks of alcohol     Types: 7 Glasses of wine per week    Drug use: Never     Family History   Problem Relation Age of Onset    Breast cancer Paternal Grandmother         unk age of onset    Cancer Maternal Grandfather         stomach ca (possibly)    Ovarian cancer Neg Hx      OB History    Para Term  AB Living   1 1 1         SAB IAB Ectopic Multiple Live Births                  # Outcome Date GA Lbr Lee/2nd Weight Sex Delivery Anes PTL Lv   1 Term                     ROS:  GENERAL: No fever, chills, or weight  loss.  VULVAR: No pain, no lesions and no itching.  VAGINAL: No relaxation, no itching, no discharge, no abnormal bleeding and no lesions.  ABDOMEN: No abdominal pain. Denies nausea. Denies vomiting. No diarrhea. No constipation  BREAST: Denies pain. No lumps. No discharge.  URINARY: No incontinence, no nocturia, no frequency and no dysuria.  CARDIOVASCULAR: No chest pain. No shortness of breath. No leg cramps.  NEUROLOGICAL: No headaches. No vision changes.    Vitals:    12/14/23 0913   BP: 100/60       PHYSICAL EXAM:  GEN: NAD  Resp: nl effort  Psych: nl affect  Neuro: no focal deficits  Skin: warm and dry    ASSESSMENT and PLAN:    ICD-10-CM ICD-9-CM    1. Encounter for annual routine gynecological examination  Z01.419 V72.31       2. Low libido  R68.82 799.81 Follicle Stimulating Hormone      TSH      Estradiol      Progesterone      Hemoglobin A1C      TESTOSTERONE      LUTEINIZING HORMONE      3. Fatigue, unspecified type  R53.83 780.79 TSH      Estradiol      Progesterone      Hemoglobin A1C      TESTOSTERONE      LUTEINIZING HORMONE        Labs today. Discussed if hormones low, would not recommend HRT due to stoke history. Recommend ristela for libido.     FOLLOW UP: PRN lack of improvement.

## 2023-12-18 ENCOUNTER — OFFICE VISIT (OUTPATIENT)
Dept: NEUROLOGY | Facility: CLINIC | Age: 53
End: 2023-12-18
Payer: COMMERCIAL

## 2023-12-18 DIAGNOSIS — R41.89 COGNITIVE CHANGES: Primary | ICD-10-CM

## 2023-12-18 DIAGNOSIS — F32.A ANXIETY AND DEPRESSION: ICD-10-CM

## 2023-12-18 DIAGNOSIS — Z86.73 HISTORY OF CEREBELLAR STROKE: ICD-10-CM

## 2023-12-18 DIAGNOSIS — F41.9 ANXIETY AND DEPRESSION: ICD-10-CM

## 2023-12-18 PROCEDURE — 96139 PSYCL/NRPSYC TST TECH EA: CPT | Mod: S$GLB,,, | Performed by: CLINICAL NEUROPSYCHOLOGIST

## 2023-12-18 PROCEDURE — 99499 UNLISTED E&M SERVICE: CPT | Mod: S$GLB,,, | Performed by: CLINICAL NEUROPSYCHOLOGIST

## 2023-12-18 PROCEDURE — 96132 NRPSYC TST EVAL PHYS/QHP 1ST: CPT | Mod: S$GLB,,, | Performed by: CLINICAL NEUROPSYCHOLOGIST

## 2023-12-18 PROCEDURE — 99999 PR PBB SHADOW E&M-EST. PATIENT-LVL II: CPT | Mod: PBBFAC,,, | Performed by: CLINICAL NEUROPSYCHOLOGIST

## 2023-12-18 PROCEDURE — 96138 PSYCL/NRPSYC TECH 1ST: CPT | Mod: S$GLB,,, | Performed by: CLINICAL NEUROPSYCHOLOGIST

## 2023-12-18 PROCEDURE — 96133 NRPSYC TST EVAL PHYS/QHP EA: CPT | Mod: S$GLB,,, | Performed by: CLINICAL NEUROPSYCHOLOGIST

## 2023-12-22 ENCOUNTER — PATIENT MESSAGE (OUTPATIENT)
Dept: OBSTETRICS AND GYNECOLOGY | Facility: CLINIC | Age: 53
End: 2023-12-22
Payer: COMMERCIAL

## 2023-12-22 DIAGNOSIS — Z91.89 INCREASED RISK OF BREAST CANCER: Primary | ICD-10-CM

## 2023-12-23 DIAGNOSIS — Z01.419 WELL WOMAN EXAM WITH ROUTINE GYNECOLOGICAL EXAM: ICD-10-CM

## 2023-12-24 RX ORDER — ESCITALOPRAM OXALATE 20 MG/1
20 TABLET ORAL DAILY
Qty: 90 TABLET | Refills: 0 | Status: SHIPPED | OUTPATIENT
Start: 2023-12-24 | End: 2024-03-26

## 2023-12-24 NOTE — TELEPHONE ENCOUNTER
Refill Decision Note   Kamilah Romero  is requesting a refill authorization.  Brief Assessment and Rationale for Refill:        Medication Therapy Plan:       Medication Reconciliation Completed: No   Comments:     No Care Gaps recommended.     Note composed:2:00 PM 12/24/2023

## 2023-12-27 ENCOUNTER — TELEPHONE (OUTPATIENT)
Dept: SURGERY | Facility: CLINIC | Age: 53
End: 2023-12-27
Payer: COMMERCIAL

## 2023-12-29 NOTE — PROGRESS NOTES
NEUROPSYCHOLOGICAL EVALUATION - CONFIDENTIAL    Referring Provider: Drew Juarez MD   Medical Necessity: Evaluate cognitive and emotional functioning, participate in treatment planning/management, and provide supportive therapy in the setting of history of cerebellar stroke  Date Conducted: 12/5/2023 & 12/18/2023  Present At Visit: the patient  Referral Diagnoses: Z86.73 (ICD-10-CM) - History of cerebellar stroke   Consent: The patient expressed an understanding of the purpose of the evaluation and consented to all procedures. She additionally provided consent to allowing Reese Torres, PhD, a clinical neuropsychology fellow under the direct supervision of Dr. Landon, to be involved in her care. We discussed the limits of confidentiality and discussed an emergency plan.    ASSESSMENT & PLAN:   Ms. Kamilah Romero is a 53 y.o., female with 16 years of education and pertinent medical history including COVID-19, history of migraines (none presently), and depression who was referred for a neuropsychological evaluation in the setting of history of cerebellar stroke.      Compared to high average range premorbid estimates (based on both demographic information and a word reading test), results of the current evaluation reveals an intact and at expectation cognitive profile. Measures of language, visuospatial/construction, verbal and visual learning and memory, attention, working memory, processing speed, and executive functioning were all within normal limits. On self-report screening questionnaires, she endorsed a mild degree of clinically significant depression and minimal anxiety. Temporal orientation was intact and she was a strong historian.     Overall, Ms. Romero's cognitive performance is entirely within normal limits. She does not meet criteria for a neurocognitive disorder diagnosis. This is terrific news! She has a history of right cerebellar stroke and has noticed some post-COVID cognitive  inefficiency, however, neither of these appears to have caused any objective cognitive decline. Suspicion for emerging neurodegenerative conditions is also very low.     She has a history of depression, is currently prescribed escitalopram, and recently re-engaged in psychotherapy (beginning of December 2023). Ms. Romero is encouraged to continue to work on treating depression as attenuation of her symptoms will likely help with cognitive efficiency. She was able to acknowledge some additional factors that may impact her cognition during the clinical interview, namely poor sleep and alcohol use. These too are points of intervention, as with improved sleep hygiene and reduced alcohol consumption, she should notice improvement in her efficiency of thinking. The following recommendations are offered:    Ms. Romero has recently re-started psychotherapy. She is encouraged to continue attending therapy sessions and following her therapist's treatment plan.   We believe Ms. Romero would benefit from practicing mindfulness. Information on this included at the end of the report.   It is recommended she maintain good control over vascular risk factors by attending medical appointments, exercising, and taking medications as prescribed.   Information on post-COVID symptoms included at the end of this report.  Additional information on sleep hygiene included at the end of this report. We can also refer Ms. Romero to a sleep hygiene therapy program if interested in pursuing.   Ms. Romero is encouraged to focus on brain health behaviors. Information on brain health behaviors, brain health resource books and pod casts, and a list of brain training applications with research showing they help to improve cognition are included at the end of this report.  Cognitive tips and strategies included at the end of this report.  Re-evaluation is not presently indicated. That said, she is welcome to return for re-evaluation if she notices thinking  changes persist despite implementation of the treatment plan. She is welcome to return for a check-in at any time to update treatment planning.    Problem List Items Addressed This Visit          Neuro    History of cerebellar stroke       Psychiatric    Anxiety and depression     Other Visit Diagnoses       Cognitive changes    -  Primary          Thank you for allowing us to assist in Ms. Kamilah Romero's care. If you have any questions, please contact Dr. Landon at [phone].    Reese Torres, PhD  Clinical Neuropsychology Fellow    Lydia Landon, PhD, ABPP  Board Certified in Clinical Neuropsychology  [facility]     CLINICAL INTERVIEW & RECORD REVIEW:     Cognitive Functioning   Cognitive screener: none  Previous evaluation(s): none  Onset & course of difficulty: Per medical records, the patient tested positive for COVID-19 in October 2020. She contracted COVID-19 again in January 2023 and had mild upper respiratory infection symptoms without fever that last approximately 10 days, followed by chronic dizziness, brain fog, and lightheadedness. She was not hospitalized for COVID and did not require supplemental oxygen. She was seen by neurology at [facility] in March 2023 MRI of the brain revealed a small lesion in the right cerebellar hemisphere and two smaller lesions in the right frontal white matter. She reported memory changes beginning in March 2023 or April 2023, with some improvement observed after undergoing 40 treatments of hyperbaric oxygen therapy (but not a full return to her baseline). She was seen by Dr. Drew Juarez in September 2023 and underwent an EEG in October (results were normal), and a neuropsychological evaluation was ordered due to her ongoing changes to cognition.   First symptom/problem observed: memory  Fluctuations: She reported fluctuations in early 2023 but feels cognition has been stable more  recently.    Examples:   Attention/Working Memory/Executive Functioning: Organization has gotten more difficult. She has not been able to multitask since March 2023/April 2023.  Processing Speed: slowed processing speed  Language: Some difficulty with word finding  Visuospatial: No changes reported   Learning & Memory: She reported her short-term memory has worsened over the last 8 months. She has difficulty retaining and recalling information and has noticed she is misplacing items (e.g., keys). She has to write information down in order to remember. She will forget what she is talking about in the middle of conversations.   Exacerbating factors: poor sleep and alcohol use make cognition worse.  Ameliorating factors: getting good sleep, not using alcohol, hyperbaric oxygen therapy improve cognition   Medication for cognition: none    Daily Functioning    Bathing: Independent and without difficulty  Dressing: Independent and without difficulty  Grooming: Independent and without difficulty  Toileting: Independent and without difficulty  Transferring: Independent and without difficulty.  Eating: Independent and without difficulty.    Finances: She manages the finances. She reported occassionally forgetting to pay her credit card statement bills but stated this is not a new problem.  Medication Mgmt: Independent and without difficulty  Driving: She reported difficulty driving at night and stated she has a hard time seeing potholes and does not trust her peripheral vision. She denied getting lost while driving.    Household Mgmt: Independent and without difficulty  Cooking/Meal Preparation: She has to double check herself when she cooks to make sure she is cooking right or double-checking ingredients. She stays in the kitchen while cooking now because she fears she will forget. Her  has taken on more of the cooking since she found out about the stroke.   Shopping: She and her  both manage   Appointment  "Mgmt: She manages her own appointments. She writes appointments down but sometimes writes the information wrong (e.g., date or time of appointment).  Employment: Independent and without difficulty     Psychiatric/Neuropsychiatric Symptoms   Mood: "I'm okay, tired"  Depression: She feels moodier and down related to her medical problems  Anila/Hypomania: no  Anxiety: no  Stress: no  Coping Mechanisms: cry, yoga, meditate, talk with people ( and friends)  Social Withdrawal: She has withdrawn more and is not going out as much. She is unsure if this is due to the weather getting cooler or something else.  Neurovegetative Sxs:  Appetite: No changes reported  Sleep: She sleeps about 8-9 hours per week. She reported in the last week she has slept 6-7 hours per night. No snoring, walking, or talking in sleep.    Energy: She takes naps during the day. She has low energy during the day and is tired more frequently.  Hallucinations: no  Delusional/Paranoid Thinking: no  Impulsivity: no  Obsessive/Compulsive Behaviors: no  Disinhibition: no  Irritability/Agitation: no  Aggression: no  Apathy/Indifference: Less motivated to exercise but she will still do it.  Other changes in personality: no    Physical Functioning   Tremor: no  Difficulty walking: no  Imbalance: Her balance is off due to dizziness.   Falls: no  Weakness: no  Trouble with fine motor movements: no  Lightheadedness: She feels dizzy often. She reported it feels like there is "swirling in her head...like she is bobbing."  Urinary Urgency: no  Sensory Sxs: Startles more easily with sounds and has more difficulty with vision when driving at night.  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    Past Surgical History:   Procedure Laterality Date    BREAST BIOPSY Right 12/2020    BREAST BIOPSY Right 09/2020    VAGINAL DELIVERY       Neurological History    Headaches/Migraines: She had migraines in early adulthood that stopped after she changed her diet.   TBI: no  Seizures: no  Stroke: Brain MRI in March 2023 revealed right cerebellar infarct. She had no symptoms.   Tumor: no  Previous Episodes of Delirium: no  Movement Disorder: no  CNS Infection: no  Other: no    Neurodiagnostics   MRI Brain c/s Contrast   Date of Service: 03/30/2023  Impression  There are a few punctate foci of abnormal white matter signal intensity which are nonspecific but likely microvascular changes. There is a small area of encephalomalacia involving the right posterior lateral cerebellar hemisphere consistent with an area of chronic infraction. This measures approximately 13 x 6 mm.     EEG  Date of Service: 10/4/2023  Impression:   This is a normal awake and asleep routine EEG. There are no prominent focal findings, no epileptiform discharges, and no electrographic seizures. Of note, a normal EEG does not rule out the diagnosis of epilepsy. Clinical correlation is advised.    Pertinent Lab Work     Lab Results   Component Value Date    SATNHTTU68 570 10/04/2023     No results found for: "RPR"  No results found for: "FOLATE"  Lab Results   Component Value Date    TSH 1.257 12/14/2023     Lab Results   Component Value Date    HGBA1C 5.5 12/14/2023     No results found for: "HIV1X2", "RFR23FXIU"    Medications     Current Outpatient Medications:     EScitalopram oxalate (LEXAPRO) 20 MG tablet, TAKE 1 TABLET (20 MG TOTAL) BY MOUTH ONCE DAILY., Disp: 90 tablet, Rfl: 0    rosuvastatin (CRESTOR) 20 MG tablet, Take 1 tablet (20 mg total) by mouth once daily., Disp: 90 tablet, Rfl: 3     Psychiatric History   Prior Diagnoses: She reported none. Per chart, she has a history of " anxiety and depression   History of Trauma/Abuse: no  History of Suicide Attempts: no  Current Ideation, Intention, or Plan: no  Homicidal Ideation: no  Medication(s): She is prescribed Lexapro for mood  Hospitalization(s): no  Psychotherapy/Counseling: She has been in psychotherapy on and off since age 18. She recently re-engaged in psychotherapy 2 weeks ago.   Other: no    Substance Use History   Ms. Romero  reports that she quit smoking about 5 years ago. Her smoking use included cigarettes. She has never used smokeless tobacco. She reports current alcohol use of about 7.0 standard drinks of alcohol per week. She reports that she does not use drugs. She reported drinking alcohol (wine or martini) social. She denied use of tobacco or other substance.   History of abuse/overuse: no     Family Neurological & Psychiatric History     family history includes Breast cancer in her paternal grandmother; Cancer in her maternal grandfather.  Neurologic: Paternal grandmother had dementia (diagnosed age 70)   Psychiatric: Maternal grandmother was diagnosed with paranoid schizophrenia    Development  Education   Born & raised: Born in [place], raised in [place]  Prenatal and  development: [facility]  Developmental milestones: wnl  Language Acquisition: English first language  Level Attained: Bachelor's in psychology  Learning/Attention/Behavior Difficulties: no  Repeated Grade(s): no  Typical Grades: A/B student; occasionally made Cs        Occupation  Social    Service: no  Occupational Status: Self-employed  Primary Occupation:  for 20-25 years  Family Status:  14 years; 1 child (son)  Support System: , friends  Hobbies/Activities: gardening, art, writing, graphic design, reading, taking care of    Current Living Situation: She lives in a house with her  and son      OBJECTIVE:     MENTAL STATUS AND OBSERVATIONS:   Appearance: Casually dressed and adequate  "grooming/hygiene.   Alertness: Attentive and alert.   Orientation:   With the exception of being off by one when stating the date (4th when it was the 5th), she was O x 4 during the clinical interview. On day of testing, she was O x 4.    Gait:  Independent   Psychomotor:  Unremarkable   Handedness:  Right   Vision & Hearing:  Adequate for session. She wore reading glasses on test day.   Speech/language: Normal in rate, rhythm, tone, and volume. No significant word finding difficulty observed. Comprehension was normal on day of clinical interview. During testing, she required clarification and repetition of instructions/prompts to ensure her comprehension.   Mood/Affect:  On day of clinical interview, the patients stated mood was "I'm okay" with congruent affect. She appeared anxious with congruent affect on test day. At times, she exhibited poor frustration tolerance on challenging tests.   Interpersonal Behavior:  Rapport was quickly and easily established    Suicidality/Homicidality: Denied   Hallucinations/Delusions:  None evidenced or endorsed   Thought Content: Logical   Thought Processes: Goal-directed   Insight & Judgment:  Appropriate   Participation in Interview:  Full     PROCEDURES/TESTS ADMINISTERED: In addition to performing a review of pertinent medical records, reviewing limits to confidentiality, conducting a clinical interview, and explaining procedures, the following measures were administered by PEEWEE Dubon, a trained psychometrician/psychometrist under the direct supervision of Dr. Landon: MSVT; Test of Premorbid Functioning (TOPF); Wechsler Adult Intelligence Scale, Fourth Edition (WAIS-IV) [Digit Span, Arithmetic, Symbol Search, and Coding subtests]; Wechsler Memory Scale, Fourth Edition (WMS-IV) [Logical Memory and Visual Reproduction subtests]; Webster Verbal Learning Test-Revised (HVLT-R; Form 1); Neuropsychological Assessment Battery (NAB) [Naming subtest, form 1]; Verbal " "fluency tests (FAS & animal naming; Abimbola et al., 2004 norms); Rashid Complex Figure Test (RCFT) [copy only]; Trail Making Test, parts A and B (Abimbola et al., 2004 norms); Wisconsin Card Sorting Test -64 card version (WCST-64); Ovalle Depression Inventory-Second Edition (BDI-2); and Generalized Anxiety Disorder - 7 Item Scale (MICHELLE-7). Manual norms were used unless otherwise indicated.      TEST TAKING BEHAVIOR AND VALIDITY: Ms. Romero often critical of and underestimated her performance throughout testing. She made statements like, "I have no memory whatsoever" and "I can't remember anything anymore." She often sighed and cursed during more challenging tests. Overall, she worked quickly and was able to persevere with reassurance and encouragement. Scores on stand-alone and embedded performance validity measures were within normal limits. The current results, therefore, are likely an accurate reflection of the patient's current functioning.    TEST RESULTS    Raw Score Type of Standardized Score Standardized Score Percentile/CP Descriptor   MSVT  - - - -   MSVT  - - - -   MSVT Cons 100 - - - -   MSVT  - - - -   MSVT FR 90 - - - -   ACS LM II Rec 24 - - - -   ACS VR II Rec 6 - - - -   ACS RDS 12 - - - -   HVLT-R Recognition Discrimination 11 - - - -   PREMORBID FUNCTIONING Raw Score Type of Standardized Score Standardized Score Percentile/CP Descriptor   TOPF simple dem. eFSIQ -  70 Average   TOPF pred. eFSIQ -  87 High Average   TOPF simple + pred. eFSIQ -  81 High Average   LANGUAGE FUNCTIONING Raw Score Type of Standardized Score Standardized Score Percentile/CP Descriptor   TOPF Word Reading 62  90 High Average   NAB Naming 30 Tscore 52 58 Average   FAS 49 Tscore 51 54 Average   Animal Naming 30 Tscore 65 93 Above Average   VISUOSPATIAL FUNCTIONING Raw Score Type of Standardized Score Standardized Score Percentile/CP Descriptor   RCFT Copy 34 - - >16 WNL   RCFT Time to Copy 127 " - - >16 WNL   VR Copy 43 - - >75 High Average   LEARNING & MEMORY Raw Score Type of Standardized Score Standardized Score Percentile/CP Descriptor   HVLT-R         Total Immediate (10, 10, 10) 30 Tscore 56 73 Average   Delayed Recall 9 Tscore 44 27 Average   Retention % 90 Tscore 47 38 Average   Hits 11 - - - -   False Positives 0 - - - -   Discrimination  11 Tscore 51 54 Average   WMS-IV Subtests         LM I 25 ss 10 50 Average   LM II 24 ss 11 63 Average   LM Recognition 24 - - 26-50 Average   VR I 38 ss 11 63 Average   VR II 30 ss 11 63 Average   VR II Recognition 6 - - 51-75 Average   VR Copy 43 - - >75 High Average   ATTENTION/WORKING MEMORY Raw Score Type of Standardized Score Standardized Score Percentile/CP Descriptor   WAIS-IV WMI -  63 Average   WAIS-IV Digit Span 31 ss 12 75 High Average         DS Forward 11 ss 11 63 Average         DS Backward 10 ss 11 63 Average         DS Sequence 10 ss 12 75 High Average         Longest Digit Forward 7 - - - -         Longest Digit Backward 5 - - - -         Longest Digit Sequence 7 - - - -   WAIS-IV Arithmetic 15 ss 10 50 Average   MENTAL PROCESSING SPEED Raw Score Type of Standardized Score Standardized Score Percentile/CP Descriptor   WAIS-IV PSI -  87 High Average   WAIS-IV Symbol Search 32 ss 11 63 Average   WAIS-IV Coding 92 ss 15 95 Above Average   TMT A  16 Tscore 71 98 Exceptionally High   TMT A errors 0 - - - -   EXECUTIVE FUNCTIONING Raw Score Type of Standardized Score Standardized Score Percentile/CP Descriptor   TMT B 40 Tscore 62 88 High Average   TMT B errors 0 - - - -   WCST-64         Total Correct 53 SS - - -   Total Errors 11  66 Average   Perseverative Resp. 5  68 Average   Perseverative Err. 5 SS 99 47 Average   Nonperseverative Err. 6 SS 95 37 Average   Concept. Level Response 50 SS 99 47 Average   Categories Completed 5 - - >16 WNL   FMS 0 - - - -   Learning to Learn -0.32 - - >16 WNL   MOOD & PERSONALITY Raw Score  Type of Standardized Score Standardized Score Percentile/CP Descriptor   BDI-2 14 - - - Mild   MICHELLE-7 1 - - - WNL   ss = scaled score (mean = 10, SD = 3); SS = standard score (mean = 100, SD = 15); Tscore mean = 50, SD = 10; zscore (mean = 0.00, SD = 1)  It is important to note that scores/percentiles should only be interpreted by a neuropsychologist. It is common for healthy individuals to have 1-3 isolated low/unusual scores that are not indicative of any significant cognitive dysfunction.     BILLING  Code Description Minutes Units   29069 Psychiatric Interview 0    39130 Nubhvl xm phys/qhp 1st hr 0    89684 Nubhvl xm phy/qhp ea addl hr 0    35691 Psycl tst eval phys/qhp 1st 0    68711 Psycl tst eval phys/qhp ea 0    29453 Nrpsyc tst eval phys/qhp 1st 60 1   74176 Nrpsyc tst eval phys/qhp ea 97 2     Referral review/test selection 30      Tech consult/test review/modifications 10      Patient limitation management 0      Patient behavior management 0      Patient symptom monitoring 0      Record Review/Integration/Report Generation 86      Face-to-Face interpretive Feedback 31    03093 Psycl/nrpsyc tst phy/qhp 1st 0    34767 Psycl/nrpsyc tst phy/qhp ea 0    38698 Psycl/nrpsyc tech 1st 30 1   07235 Psycl/nrpsyc tst tech ea 151 5

## 2024-01-09 NOTE — PATIENT INSTRUCTIONS
"MINDFULNESS  Mindfulness is the basic human ability to be fully present, aware of where we are and what were doing, and not overly reactive or overwhelmed by whats going on around us. While mindfulness is something we all naturally possess, its more readily available to us when we practice on a daily basis. Whenever you bring awareness to what youre directly experiencing via your senses, or to your state of mind via your thoughts and emotions, youre being mindful. And theres growing research showing that when you train your brain to be mindful, youre actually remodeling the physical structure of your brain.    What is meditation?  Meditation is exploring. Its not a fixed destination. Your head doesnt become vacuumed free of thought, utterly undistracted. When we meditate we venture into the workings of our minds: our sensations (air blowing on our skin or a harsh smell wafting into the room), our emotions (love this, hate that, crave this, loathe that) and thoughts (wouldnt it be weird to see an elephant playing a trumpet).    Mindfulness meditation asks us to suspend judgment and unleash our natural curiosity about the workings of the mind, approaching our experience with warmth and kindness, to ourselves and others.    How do I practice mindfulness and meditation?  Mindfulness is available to us in every moment, whether through meditations and body scans, or mindful moment practices like taking time to pause and breathe when the phone rings instead of rushing to answer it. Reminding yourself to take notice of your thoughts, feelings, body sensations and the world around you is the first step to mindfulness.    Notice the everyday  "Even as we go about our daily lives, we can notice the sensations of things, the food we eat, the air moving past the body as we walk," says Professor Grissom. "All this may sound very small, but it has huge power to interrupt the 'autopilot' mode we often engage day to day, " "and to give us new perspectives on life."    Keep it regular  It can be helpful to pick a regular time - the morning journey to work or a walk at lunchtime - during which you decide to be aware of the sensations created by the world around you.    Try something new  Trying new things, such as sitting in a different seat in meetings or going somewhere new for lunch, can also help you notice the world in a new way.    Watch your thoughts  "Some people find it very difficult to practice mindfulness. As soon as they stop what they're doing, lots of thoughts and worries crowd in," says Professor Grissom.    "It might be useful to remember that mindfulness isn't about making these thoughts go away, but rather about seeing them as mental events.    "Imagine standing at a bus station and seeing 'thought buses' coming and going without having to get on them and be taken away. This can be very hard at first, but with gentle persistence it is possible.    "Some people find that it is easier to cope with an over-busy mind if they are doing gentle yoga or walking."    Name thoughts and feelings  To develop an awareness of thoughts and feelings, some people find it helpful to silently name them: "Here's the thought that I might fail that exam". Or, "This is anxiety".    Free yourself from the past and future  You can practise mindfulness anywhere, but it can be especially helpful to take a mindful approach if you realise that, for several minutes, you have been "trapped" in reliving past problems or "pre-living" future worries.    Different mindfulness practices  As well as practising mindfulness in daily life, it can be helpful to set aside time for a more formal mindfulness practice.    Mindfulness meditation involves sitting silently and paying attention to thoughts, sounds, the sensations of breathing or parts of the body, bringing your attention back whenever the mind starts to wander.    Yoga and diane-chi can also help with " developing awareness of your breathing.    Mindfulness Resources:  www.mindful.org  HeadSpace Anthony (free access to [employer] in 2020 for healthcare providers with an NPI number)  Calm Anthony    Mindfulness Books:  Mindfulness for Beginners, by Jens De  The Mindful Way Through Anxiety, by Yenny Jain  The Little Book of Mindfulness, by Terry Mancilla    Material adapted from: https://www.nhs.uk/conditions/stress-anxiety-depression/mindfulness/ and mindful.org    MANAGING VASCULAR RISK FACTORS  A personal history of disorders that affect the cardiovascular system (e.g., hypertension, high cholesterol, diabetes, heart disease) can have a negative impact on brain functioning especially over many years. Therefore, it is very important for this patient to maintain good control over his/her risk factors. The following is recommended:  Take all medications as prescribed and follow-up with recommendations above.  Get regular physical exercise to the extent that it is possible. Family may need to structure this into their loved one's day or week and develop a transportation plan.  Eat a well-balanced diet and following the MIND diet (see handout) has been shown to be most brain protective.   Check your blood pressure, cholesterol levels, blood sugar, and others as appropriate.    PRACTICE GOOD COGNITIVE HYGIENE  Engage in regular exercise, which increases alertness and arousal and can improve attention and focus.  Consider lower impact exercises, such as yoga or light walking. Try to exercise for at least 150 minutes per week  Get a good night's sleep, as this can enhance alertness and cognition.  Eat healthy foods and balanced meals. It is notable that research indicates certain nutrients may aid in brain function, such as B vitamins (especially B6, B12, and folic acid), antioxidants (such as vitamins C and E, and beta carotene), and Omega-3 fatty acids. Here are some common tips for diet  (Adopted from Antwan et al, [facility], 2018):  Eat primarily plant-based foods, such as fruits and vegetables, whole grains, legumes   (beans) and nuts.  Limit refined carbohydrates (white pasta, bread, rice).  Replace butter with healthy fats such as olive oil.  Use herbs and spices instead of salt to flavor foods.  Limit red meat and processed meats to no more than a few times a month.  Avoid sugary sodas, bakery goods, and sweets.  Eat fish and poultry at least twice a week.  Keep your brain active. Find activities to stay mentally active, such as reading, games (cards, checkers), puzzles (crosswords, Sudoku, jig saw), crafts (models, woodworking), gardening, or participating in activities in the community.  Stay socially engaged. Continue staying active with your family and friends.    RESOURCE BOOKS & POD CASTS  Consider purchasing the following books on brain health:   High-Octane Brain: 5 Science-Based Steps to Sharpen Your Memory and Reduce Your Risk of Alzheimer's by Vandana Pollock, PhD, ABPP-CN.   Keep Your Wits About You: The Science of Brain Maintenance As You Age by Trudy Ramos, PhD.   The Brain Health Book: Using the Power of Neuroscience to Improve Your Life by Vinicio Mcgrath, PhD, ABPP-CN.     Consider listening to Brain Beat, a pod cast series by the National Academy of Neuropsych[employer] Foundation featuring discussions with experts on brain health and functioning.     BRAIN TRAINING APPS  · BrainHQ (https://www.brainhNeoCodex.com/) - BrainHQ has more than two dozen brain-training exercises organized into six categories: Attention, Brain Speed, Memory, People Skills, Intelligence, and Navigation. It allows you to fit brain exercises into your busy life, and access brain training on most internet-connected devices. Plus, each exercise continuously adapts to your unique performance. So you train at the right level for you.     · Mind Games (https://www.mindgames.com/) - Mind Games is a great collection of games  based in part on principles derived from cognitive tasks to help you practice different mental skills. This includes a handful of free games. Additionally, there are a number of trial games included that can be played 3 times. All games include your score history and a graph of your progress. Using some principles of standardized testing, your scores are also converted to a standard scale so that you can see where you need work and excel. The training center does the work for you by picking the perfect mix of exercises to keep you engaged.     · Elevate (https://Publimind/) - Elevate was selected by [employer] as Anthony of the Year! Elevate is a brain training program designed to improve focus, speaking abilities, processing speed, memory, math skills, and more. Each person is provided with a personalized training program that adjusts over time to maximize results. Theoretically, the more you train with Elevate, the more you'll improve critical cognitive skills that are proven to boost productivity, earning power, and self-confidence. Users who train at least 3 times per week have reported dramatic gains and increased confidence. In-anthony purchases.     · Peak (https://www.Paylocity.net/) - Reach Peak performance with over 40 unique games, each one developed by neuroscientists and game experts to challenge your cognitive skills and push you further. Use , the  for your brain, to find the right workout for you at the right time. Choose from 's best recommendations to push your skills to the max. Or take contextual workouts like Coffee Break if you're short on time.  will help you track your progress using in-depth insights and keep you going when you need it most. Play for free or upgrade to Pro and get the best brain training experience available. In-anthony purchases.     OTHER SUGGESTIONS  Games and Apps like Sudoku; Crossword Puzzles; Word Find; Memory Games; Logic Games; Solitaire; and Hidden  Object Mysteries. Find some you like and play them. It will exercise many of the skills necessary to improve function.    COGNITIVE TIPS AND STRATEGIES  The following tips and strategies are provided to help assist in daily activities:      Attention: Remember that inattention and lack of focus are major culprits to forgetting information so be sure and practice paying attention for adequate learning of information. If you rely on passive attention to remembering something (e.g., yeah, uh-huh approach), you'll find you cannot recall it later. I recommend the following to improve attention, which may aid in later recall:  1. Reduce distractions in the area as much as possible  2. Look at the person as they are speaking to you.   3. Paraphrase as they are speaking  4. Write down important pieces of information   5. Ask them to repeat if you zone out.  6. Have them simplify and reduce information that you need to attend to during conversation.  7. Have visual cues to remind you if you need to do something later.     Processing Speed:  1. Using multiple modalities (e.g., listening, writing notes, asking questions, recording) to learn new information is likely to allow additional time for processing, thus improving memory for the material.   2. Allowing sufficient time to complete tasks will reduce frustration and help to ensure completion.     Executive Functionin. Don't attempt to multi-task.  Separate tasks so that each can be completed one at a time  2. Consider using a calendar/day planner, as that may be effective to help you plan and stay on track.  Color-coding specific tasks by importance may add additional benefit to your planner  3. Break down large projects into smaller tasks and write down the steps to completing the task.  Taking notes while reading can help with recall.     Storing Information: Use the below strategies to help you further enhance how information is stored  1. Rehearse -  Immediately after seeing/hearing something, try to recall it.  Wait a few minutes, then check again.  Gradually lengthen the intervals between rehearsals.  2. Repetition of learned material is critical to ensure storage of information to be learned. Self-test at home to ensure learning.  3. Write down important information to improve your attention and focus and to have something to look back on when you need to recall it.  4. Make sure the person doesn't rattle off, but presents in a clear, logical, and unhurried manner.      Recalling Information:  1. Jog your memory - Lose something?  Think back to when you last had it.  What did you do next?  And after that?  Mentally walk yourself through each activity that followed.  Prodding your memory this way may enable you to recall the location of the missing item.  2. Use a cue - Symbolic reminders (the proverbial string around the finger) are helpful.  So too are memos, timers, calendar notes, etc.--keep them in visible, appropriate place  3. Get organized - Have fixed locations for all important papers, key phone numbers, medications, keys, wallet, glasses, tools, etc.  4. Develop routines - Routines can anchor memories so they do not drift away.       Word Finding:   Not being able to find a word when you need it is a common and very annoying problem. It is not strictly a memory issue, but more a filing and retrieval issue. You know the word or name you want, but it cannot be found in your brain file. It is like having all the files in your file cabinet emptied on the floor. Every piece of information is there but finding it can be a challenge.   One strategy that may help is working with a speech pathologist/therapist to learn techniques to decrease word finding problems. Some of those strategies/techniques include the following:  Use circumlocutions. Describe the object you're trying to name instead of naming it.  Recite you're A, B, Cs. Go through the alphabet  to see if a letter triggers finding the word.  Picture it. Try to visualize the spelling or writing of the word.  Relax. The harder you try to force yourself to come up with the word, the more frustrated you get and the worse you function.   Write it down. In situations where using correct words is critical, such as communicating on the radio while flying, write down the key words so that they are in front of you if needed.  Use word association. For words or names you continually misfile and can't find, try to come up with a word association, something that reminds you of the word or name.  Write a script. Try scripting something important that you want to say. Either write it out or practice it beforehand, so that you get it right.  Play word games. Doing crossword puzzles and playing word finding games may help your brain become more efficient at filing and retrieving words.    SLEEP HYGIENE  Poor sleep has a negative effect on cognition. Several strategies have been shown to improve sleep:   Caffeine intake in the afternoon and evening, as well as stuffing oneself at supper, can decrease the quality of restful sleep throughout the night.   Bedtime and wake-up times should be consistent every night and morning so the body becomes used to a single routine, even on the weekends.  Engage in daily physical activity, but not 2-3 hours before bedtime.   No technology use (television, computer, iPad) 1-2 hours before bed.   Have a wind down routine (e.g., soft lights in the house, bath before bed, reduced fluid intake, songs, reading, less noise) to promote sleep readiness.   Visit the www.sleepfoundation.org for more strategies.     Management of Post-COVID Symptoms  Many Post-COVID Conditions can be improved through established symptom management  approaches.  Following with your healthcare provider for personal medical management of post-COVID symptoms is encouraged as care is often done from a  multidisciplinary approach and personalized to any current symptoms you may be experiencing.  For treatment of symptoms such as headaches, brain fog, forgetfulness, depression, anxiety, and insomnia, supportive counseling coupled with cognitive behavioral therapy is recommended (Heisaacmann et al., 2023)  Keeping diaries and/or calendars may be useful to document any changes in symptoms and possible triggers of symptoms (i.e., physical exertion).   Visit [facility] [place] Post-COVID Conditions to learn more about post-COVID conditions

## 2024-01-11 ENCOUNTER — PATIENT MESSAGE (OUTPATIENT)
Dept: NEUROLOGY | Facility: CLINIC | Age: 54
End: 2024-01-11

## 2024-01-11 ENCOUNTER — OFFICE VISIT (OUTPATIENT)
Dept: NEUROLOGY | Facility: CLINIC | Age: 54
End: 2024-01-11
Payer: COMMERCIAL

## 2024-01-11 DIAGNOSIS — R41.89 COGNITIVE CHANGES: Primary | ICD-10-CM

## 2024-01-11 DIAGNOSIS — F41.9 ANXIETY AND DEPRESSION: ICD-10-CM

## 2024-01-11 DIAGNOSIS — F32.A ANXIETY AND DEPRESSION: ICD-10-CM

## 2024-01-11 PROCEDURE — 99499 UNLISTED E&M SERVICE: CPT | Mod: 95,,, | Performed by: CLINICAL NEUROPSYCHOLOGIST

## 2024-01-11 NOTE — PROGRESS NOTES
NEUROPSYCHOLOGICAL EVALUATION FEEDBACK    TELEMEDICINE DETAILS:   The patient location is: [place]  The chief complaint leading to consultation is: feedback regarding neuropsychological test results  Visit type: Virtual visit with synchronous audio and video  Total time spent with patient: 45 minutes  Each patient to whom he or she provides medical services by telemedicine is: (1) informed of the relationship between the physician and patient and the respective role of any other health care provider with respect to management of the patient; and (2) notified that he or she may decline to receive medical services by telemedicine and may withdraw from such care at any time.  Notes: See below.    Kamilah Romero attended a feedback session today.  We discussed the results of the neuropsychological evaluation and I gave time to discuss questions and concerns. For full evaluation details, please see the note from this provider dated 12/18/2023. A copy of the report was provided via mail and nanoMRt.     Problem List Items Addressed This Visit          Psychiatric    Anxiety and depression     Other Visit Diagnoses       Cognitive changes    -  Primary            Reese Torres, PhD  Clinical Neuropsychology Fellow      Lydia Landon, PhD, ABPP  Board Certified in Clinical Neuropsychology   [facility] - Department of Neurology

## 2024-01-22 ENCOUNTER — PATIENT MESSAGE (OUTPATIENT)
Dept: PRIMARY CARE CLINIC | Facility: CLINIC | Age: 54
End: 2024-01-22
Payer: COMMERCIAL

## 2024-01-22 DIAGNOSIS — D70.8: Primary | ICD-10-CM

## 2024-01-22 NOTE — TELEPHONE ENCOUNTER
See message pt is needing a referral for Rheumatology. Spoke with her and she says it had to do with her test results from labs Dr. Wagner ordered. I have it pended below for your review and approval. Not sure if I had the correct diagnosis

## 2024-01-30 DIAGNOSIS — H83.8X3 AUTOIMMUNE DISORDER OF BOTH INNER EARS: Primary | ICD-10-CM

## 2024-01-31 ENCOUNTER — PATIENT MESSAGE (OUTPATIENT)
Dept: NEUROLOGY | Facility: CLINIC | Age: 54
End: 2024-01-31
Payer: COMMERCIAL

## 2024-03-25 DIAGNOSIS — Z01.419 WELL WOMAN EXAM WITH ROUTINE GYNECOLOGICAL EXAM: ICD-10-CM

## 2024-03-26 RX ORDER — ESCITALOPRAM OXALATE 20 MG/1
20 TABLET ORAL DAILY
Qty: 90 TABLET | Refills: 0 | Status: SHIPPED | OUTPATIENT
Start: 2024-03-26

## 2024-03-26 NOTE — TELEPHONE ENCOUNTER
Refill Decision Note   Kamilah Romero  is requesting a refill authorization.  Brief Assessment and Rationale for Refill:  Approve     Medication Therapy Plan:       Medication Reconciliation Completed: No   Comments:     No Care Gaps recommended.     Note composed:1:45 PM 03/26/2024

## 2024-04-02 ENCOUNTER — TELEPHONE (OUTPATIENT)
Dept: PRIMARY CARE CLINIC | Facility: CLINIC | Age: 54
End: 2024-04-02

## 2024-04-08 ENCOUNTER — PATIENT MESSAGE (OUTPATIENT)
Dept: PRIMARY CARE CLINIC | Facility: CLINIC | Age: 54
End: 2024-04-08
Payer: COMMERCIAL

## 2024-04-09 ENCOUNTER — PATIENT MESSAGE (OUTPATIENT)
Dept: PRIMARY CARE CLINIC | Facility: CLINIC | Age: 54
End: 2024-04-09

## 2024-04-09 ENCOUNTER — TELEPHONE (OUTPATIENT)
Dept: PRIMARY CARE CLINIC | Facility: CLINIC | Age: 54
End: 2024-04-09

## 2024-04-09 ENCOUNTER — OFFICE VISIT (OUTPATIENT)
Dept: PRIMARY CARE CLINIC | Facility: CLINIC | Age: 54
End: 2024-04-09
Payer: COMMERCIAL

## 2024-04-09 DIAGNOSIS — R53.82 CHRONIC FATIGUE: Primary | ICD-10-CM

## 2024-04-09 DIAGNOSIS — H83.2X3 VESTIBULAR HYPOFUNCTION, BILATERAL: ICD-10-CM

## 2024-04-09 DIAGNOSIS — F90.9 ATTENTION DEFICIT HYPERACTIVITY DISORDER (ADHD), UNSPECIFIED ADHD TYPE: Primary | ICD-10-CM

## 2024-04-09 DIAGNOSIS — Z86.73 HISTORY OF CEREBELLAR STROKE: ICD-10-CM

## 2024-04-09 PROCEDURE — 99213 OFFICE O/P EST LOW 20 MIN: CPT | Mod: 95,,, | Performed by: INTERNAL MEDICINE

## 2024-04-09 RX ORDER — ASPIRIN 81 MG/1
81 TABLET ORAL DAILY
COMMUNITY

## 2024-04-09 NOTE — PROGRESS NOTES
[facility] Progress Note    Audiovisual Visit  Location:  [place], [place]      Chief Complaint     No chief complaint on file.      Subjective:      History of Present Illness:  Kamilah Romero is a 54 y.o. female     Pt is inquiring about Adderall for brain fog/poor energy/fatigue. She is currently seeing an [facility] psychologist, Dr. Yusuf Bradley, who would like to refer the pt to psychiatry, Dr. Sal Love, for evaluation and tx. Pt is currently seeing a medicine naturopath and NP, Dr. Halie Pennington. Her rheumatologist, Dr. Stacey Natarajan, prescribed steroids which termporarily gave her energy but pt does NOT want to take long-term steroids.  She saw Dr. Landon, neuropsychologist who did a neuropsychological evaluation in 12/2023 that was normal and recommended improved sleep, decreased alcohol use, ongoing psychotherapy for her depression, practicing mindfulness, exercising regularly.   Pt also sees [facility] neurologist, Dr. Juarez  who dx'ed her with silent  old right cerebellar CVA but does not explain her daily vertigo and cognitive dysfunction now on ASA 81 mg daily and Crestor since  LDL >100; Grade 1 mildly + bubble study for PFO in 10/2023 and cardiac event monitor   Dr. Wagner, ENT, dx'ed her with vestibular loss due to BVH (bilateral vestibular hypofunction)    She had taken Adderall in the past (her hubby's) and states it improved the brain fog but has never been formerly evaluated for ADD.         Past Medical History:  Past Medical History:   Diagnosis Date    COVID-19 long hauler manifesting chronic neurologic symptoms 2/16/2023    Depression     stable    Lack of coordination 11/17/2022    Migraine with aura and without status migrainosus, not intractable 7/1/2019    Plan for Medrol for status migrainosus Sumatriptan 50mg prn Has tried Maxalt, Tylenol       Past Surgical History:  Past Surgical History:   Procedure Laterality Date    BREAST BIOPSY Right 12/2020     BREAST BIOPSY Right 2020    VAGINAL DELIVERY         Allergies:  Review of patient's allergies indicates:  No Known Allergies    Home Medications:  Current Outpatient Medications   Medication Sig Dispense Refill    aspirin (ECOTRIN) 81 MG EC tablet Take 81 mg by mouth once daily.      EScitalopram oxalate (LEXAPRO) 20 MG tablet TAKE 1 TABLET (20 MG TOTAL) BY MOUTH ONCE DAILY. 90 tablet 0    rosuvastatin (CRESTOR) 20 MG tablet Take 1 tablet (20 mg total) by mouth once daily. 90 tablet 3     No current facility-administered medications for this visit.        Family History:  Family History   Problem Relation Name Age of Onset    Breast cancer Paternal Grandmother          unk age of onset    Cancer Maternal Grandfather          stomach ca (possibly)    Ovarian cancer Neg Hx         Social History:  Social History     Tobacco Use    Smoking status: Former     Current packs/day: 0.00     Types: Cigarettes     Quit date: 10/29/2018     Years since quittin.5    Smokeless tobacco: Never   Substance Use Topics    Alcohol use: Yes     Alcohol/week: 7.0 standard drinks of alcohol     Types: 7 Glasses of wine per week    Drug use: Never       Review of Systems:  Pertinent positives and negatives listed in HPI. All other systems are reviewed and are negative.    Health Maintaince :   Health Maintenance Topics with due status: Not Due       Topic Last Completion Date    Colorectal Cancer Screening 05/15/2019    Cervical Cancer Screening 2022    Lipid Panel 2023    Mammogram 2023    Hemoglobin A1c (Diabetic Prevention Screening) 2023    Influenza Vaccine Not Due           Eye:   Dental:     Immunizations:     The ASCVD Risk score (Lisandro NELSON, et al., 2019) failed to calculate for the following reasons:    The patient has a prior MI or stroke diagnosis      Objective:   There were no vitals taken for this visit.     There is no height or weight on file to calculate BMI.       Physical  Examination:  General: Alert and awake in no apparent distress  Neuro:  AAOx3; cooperative and pleasant with no focal deficits    Laboratory:      Most Recent Data:  Lab Results   Component Value Date    WBC 5.76 10/04/2023    HGB 14.3 10/04/2023    HCT 42.1 10/04/2023     10/04/2023    CHOL 242 (H) 09/12/2023    TRIG 68 09/12/2023    HDL 73 09/12/2023    ALT 15 10/04/2023    AST 19 10/04/2023     10/04/2023    K 3.9 10/04/2023     10/04/2023    BUN 12 10/04/2023    CO2 29 10/04/2023    TSH 1.257 12/14/2023    HGBA1C 5.5 12/14/2023              CBC:   WBC   Date Value Ref Range Status   10/04/2023 5.76 3.90 - 12.70 K/uL Final     Hemoglobin   Date Value Ref Range Status   10/04/2023 14.3 12.0 - 16.0 g/dL Final     Hematocrit   Date Value Ref Range Status   10/04/2023 42.1 37.0 - 48.5 % Final     Platelets   Date Value Ref Range Status   10/04/2023 215 150 - 450 K/uL Final     MCV   Date Value Ref Range Status   10/04/2023 96 82 - 98 fL Final     RDW   Date Value Ref Range Status   10/04/2023 11.5 11.5 - 14.5 % Final     BMP:   Sodium   Date Value Ref Range Status   10/04/2023 138 136 - 145 mmol/L Final     Potassium   Date Value Ref Range Status   10/04/2023 3.9 3.5 - 5.1 mmol/L Final     Chloride   Date Value Ref Range Status   10/04/2023 103 95 - 110 mmol/L Final     CO2   Date Value Ref Range Status   10/04/2023 29 23 - 29 mmol/L Final     BUN   Date Value Ref Range Status   10/04/2023 12 6 - 20 mg/dL Final     Creatinine   Date Value Ref Range Status   10/04/2023 1.0 0.5 - 1.4 mg/dL Final     Glucose   Date Value Ref Range Status   10/04/2023 91 70 - 110 mg/dL Final     Calcium   Date Value Ref Range Status   10/04/2023 9.3 8.7 - 10.5 mg/dL Final     Magnesium   Date Value Ref Range Status   09/23/2022 1.9 1.6 - 2.6 mg/dL Final     LFTs:   Total Protein   Date Value Ref Range Status   10/04/2023 6.7 6.0 - 8.4 g/dL Final     Albumin   Date Value Ref Range Status   10/04/2023 4.1 3.5 - 5.2 g/dL  "Final     Total Bilirubin   Date Value Ref Range Status   10/04/2023 0.4 0.1 - 1.0 mg/dL Final     Comment:     For infants and newborns, interpretation of results should be based  on gestational age, weight and in agreement with clinical  observations.    Premature Infant recommended reference ranges:  Up to 24 hours.............<8.0 mg/dL  Up to 48 hours............<12.0 mg/dL  3-5 days..................<15.0 mg/dL  6-29 days.................<15.0 mg/dL       AST   Date Value Ref Range Status   10/04/2023 19 10 - 40 U/L Final     Alkaline Phosphatase   Date Value Ref Range Status   10/04/2023 35 (L) 55 - 135 U/L Final     ALT   Date Value Ref Range Status   10/04/2023 15 10 - 44 U/L Final     Coags: No results found for: "INR", "PROTIME", "PTT"  FLP:      Lab Results   Component Value Date    CHOL 242 (H) 09/12/2023     Lab Results   Component Value Date    HDL 73 09/12/2023     Lab Results   Component Value Date    LDLCALC 155.4 09/12/2023     Lab Results   Component Value Date    TRIG 68 09/12/2023     Lab Results   Component Value Date    CHOLHDL 30.2 09/12/2023      DM:      Hemoglobin A1C   Date Value Ref Range Status   12/14/2023 5.5 4.0 - 5.6 % Final     Comment:     ADA Screening Guidelines:  5.7-6.4%  Consistent with prediabetes  >or=6.5%  Consistent with diabetes    High levels of fetal hemoglobin interfere with the HbA1C  assay. Heterozygous hemoglobin variants (HbS, HgC, etc)do  not significantly interfere with this assay.   However, presence of multiple variants may affect accuracy.     09/23/2022 5.1 4.0 - 5.6 % Final     Comment:     ADA Screening Guidelines:  5.7-6.4%  Consistent with prediabetes  >or=6.5%  Consistent with diabetes    High levels of fetal hemoglobin interfere with the HbA1C  assay. Heterozygous hemoglobin variants (HbS, HgC, etc)do  not significantly interfere with this assay.   However, presence of multiple variants may affect accuracy.       LDL Cholesterol   Date Value Ref Range " "Status   09/12/2023 155.4 63.0 - 159.0 mg/dL Final     Comment:     The National Cholesterol Education Program (NCEP) has set the  following guidelines (reference values) for LDL Cholesterol:  Optimal.......................<130 mg/dL  Borderline High...............130-159 mg/dL  High..........................160-189 mg/dL  Very High.....................>190 mg/dL       Creatinine   Date Value Ref Range Status   10/04/2023 1.0 0.5 - 1.4 mg/dL Final     Thyroid:   TSH   Date Value Ref Range Status   12/14/2023 1.257 0.400 - 4.000 uIU/mL Final     Free T4   Date Value Ref Range Status   09/23/2022 0.73 0.71 - 1.51 ng/dL Final     Anemia:   Ferritin   Date Value Ref Range Status   09/23/2022 67 20.0 - 300.0 ng/mL Final     Vitamin B-12   Date Value Ref Range Status   10/04/2023 570 210 - 950 pg/mL Final     Cardiac: No results found for: "TROPONINI", "CKTOTAL", "CKMB", "BNP"  Urinalysis: No results found for: "LABURIN", "COLORU", "PHUA", "CLARITYU", "SPECGRAV", "LABSPEC", "NITRITE", "PROTEINUR", "GLUCOSEU", "KETONESU", "UROBILINOGEN", "BILIRUBINUR", "BLOODU", "RBCU", "WBCUA"    Other Results:  EKG (my interpretation):     Radiology:  US TCD EMBOLI WITH IV INJ  Narrative: EXAMINATION:  US TCD ARTERIES EMBOLI DETECTION W/IV MICROBUBBLE INJ    CLINICAL HISTORY:  Personal history of transient ischemic attack (TIA), and cerebral infarction without residual deficits    TECHNIQUE:  Transcranial Doppler was performed for PFO assessment with injection of agitated saline and Valsalva maneuver.  Two sessions were performed.  Standard technique was used studding the right MCA.    COMPARISON:  03/30/2023    FINDINGS:  A total of 4 high-intensity transient signals were identified, 2 with each injection following Valsalva maneuver.  Impression: Mildly positive micro bubble emboli detection study, grade 1    Electronically signed by: Timothy Hilario MD  Date:    12/08/2023  Time:    08:22          Assessment/Plan     Kamilah Romero " is a 54 y.o. female with:  1. Chronic fatigue  Assessment & Plan:  Refer to Dr. Sal Noble for eval for Adderall use       2.  ischemic vertebrobasilar artery cerebellar stroke    3. History of cerebellar stroke  Overview:  Grade 1 mildly + bubble study for PFO in 10/2023 with cardiac event monitor     Assessment & Plan:  Cont ASA 81 mg daily, Crestor 20mg daily  -f/u Neurology, Dr. Juarez       4. Vestibular hypofunction, bilateral  Assessment & Plan:  F/u  as scheduled               I spent 25  min with this pt that includes face to face time and non-face to face time preparing to see the patient (eg, review of tests), obtaining and/or reviewing separately obtained history, documenting clinical information in the electronic or other health record, independently interpreting results and communicating results to the patient/family/caregiver, or care coordinator.   Code Status:     Dary Tucker MD

## 2024-04-12 DIAGNOSIS — H81.93 VESTIBULAR DYSFUNCTION OF BOTH EARS: Primary | ICD-10-CM

## 2024-04-15 ENCOUNTER — TELEPHONE (OUTPATIENT)
Dept: PRIMARY CARE CLINIC | Facility: CLINIC | Age: 54
End: 2024-04-15
Payer: COMMERCIAL

## 2024-04-15 NOTE — TELEPHONE ENCOUNTER
----- Message from Dary Tucker MD sent at 4/12/2024 11:18 PM CDT -----  Regarding: referral  HI Bessy:  I ordered an urgent Neurology referral for Ms. Romero but the order should e faxed to [phone]   Attn: [facility]

## 2024-04-24 ENCOUNTER — PATIENT MESSAGE (OUTPATIENT)
Dept: PRIMARY CARE CLINIC | Facility: CLINIC | Age: 54
End: 2024-04-24
Payer: COMMERCIAL

## 2024-04-24 DIAGNOSIS — H83.2X9 VESTIBULAR DISEQUILIBRIUM, UNSPECIFIED LATERALITY: Primary | ICD-10-CM

## 2024-04-25 DIAGNOSIS — H83.2X3 VESTIBULAR DISEQUILIBRIUM INVOLVING BOTH INNER EARS: Primary | ICD-10-CM

## 2024-04-25 RX ORDER — ROSUVASTATIN CALCIUM 20 MG/1
20 TABLET, COATED ORAL DAILY
Qty: 90 TABLET | Refills: 3 | Status: SHIPPED | OUTPATIENT
Start: 2024-04-25 | End: 2025-04-25

## 2024-04-25 NOTE — TELEPHONE ENCOUNTER
Pt requesting referral to karol Lugo MD at [facility] for vestibular disorder. Pended. Faxed neuro referral to [place] via [facility] to [phone]

## 2024-04-29 ENCOUNTER — PATIENT MESSAGE (OUTPATIENT)
Dept: NEUROLOGY | Facility: CLINIC | Age: 54
End: 2024-04-29
Payer: COMMERCIAL

## 2024-04-30 ENCOUNTER — TELEPHONE (OUTPATIENT)
Dept: PRIMARY CARE CLINIC | Facility: CLINIC | Age: 54
End: 2024-04-30
Payer: COMMERCIAL

## 2024-05-02 ENCOUNTER — TELEPHONE (OUTPATIENT)
Dept: PRIMARY CARE CLINIC | Facility: CLINIC | Age: 54
End: 2024-05-02
Payer: COMMERCIAL

## 2024-05-02 NOTE — TELEPHONE ENCOUNTER
----- Message from Golden Mays sent at 5/2/2024  2:12 PM CDT -----  Regarding: Referal  Contact: Kamilah [phone]  1MEDICALADVICE     Patient is calling for Medical Advice regarding: Referral for Dr. Cristiana Ibarra ([facility])    How long has patient had these symptoms:    Pharmacy name and phone#:    Would like response via [facility]:  ##    Comments:    [facility] [phone]

## 2024-05-03 ENCOUNTER — TELEPHONE (OUTPATIENT)
Dept: PRIMARY CARE CLINIC | Facility: CLINIC | Age: 54
End: 2024-05-03
Payer: COMMERCIAL

## 2024-05-03 NOTE — TELEPHONE ENCOUNTER
----- Message from Nalini Miner sent at 5/2/2024  4:17 PM CDT -----  Contact: Ping/TAMIKA [phone]  TAMIKA is saying the referral to neurology they received does not say Dr Cristiana Ibarra on it even though the one in epic does. Can you please fax this referral to [phone].

## 2024-05-06 ENCOUNTER — PATIENT MESSAGE (OUTPATIENT)
Dept: OBSTETRICS AND GYNECOLOGY | Facility: CLINIC | Age: 54
End: 2024-05-06
Payer: COMMERCIAL

## 2024-05-10 ENCOUNTER — TELEPHONE (OUTPATIENT)
Dept: PSYCHIATRY | Facility: CLINIC | Age: 54
End: 2024-05-10
Payer: COMMERCIAL

## 2024-05-10 NOTE — TELEPHONE ENCOUNTER
(PT VIP) LVM for pt to call us back, if pt is trying to schedule in ADHD clinic ( see referral from 4/9/24) please schedule in ADHD clinic / Per Johanna , if Pt calls back and needs appt for her child please send Harini a message/ Venkatesh Spencer

## 2024-05-16 ENCOUNTER — TELEPHONE (OUTPATIENT)
Dept: PSYCHIATRY | Facility: CLINIC | Age: 54
End: 2024-05-16
Payer: COMMERCIAL

## 2024-05-16 NOTE — TELEPHONE ENCOUNTER
Dr Love refused to take pt on as a new pt as requested , Lvm for pt will try to get pt Scheduled with Dr Kilpatrick / please transfer call to Aileen

## 2024-05-20 ENCOUNTER — TELEPHONE (OUTPATIENT)
Dept: PSYCHIATRY | Facility: CLINIC | Age: 54
End: 2024-05-20
Payer: COMMERCIAL

## 2024-05-20 NOTE — TELEPHONE ENCOUNTER
Pt called in very upset, states she did not want to attend the virtual ADHD therapy session and to please cancel her appt . She stated if she needs ADHD medication she will get it from her primary care that this class seemed ridiculous and she does not do group sessions with anyone. She does not know what her future appt will hold with DR Kilpatrick or what he will prescribe but she feels this is a waste of her time already here in psychiatry .

## 2024-05-25 ENCOUNTER — PATIENT MESSAGE (OUTPATIENT)
Dept: NEUROLOGY | Facility: CLINIC | Age: 54
End: 2024-05-25
Payer: COMMERCIAL

## 2024-07-20 DIAGNOSIS — Z01.419 WELL WOMAN EXAM WITH ROUTINE GYNECOLOGICAL EXAM: ICD-10-CM

## 2024-07-20 NOTE — TELEPHONE ENCOUNTER
Refill Routing Note   Medication(s) are not appropriate for processing by [facility] for the following reason(s):      No active prescription written by provider    [facility] action(s):  Defer Care Due:  None identified            Appointments  past 12m or future 3m with PCP    Date Provider   Last Visit   12/14/2023 Jeansonne, Julie R., MD   Next Visit   Visit date not found Jeansonne, Julie R., MD   ED visits in past 90 days: 0        Note composed:11:51 AM 07/20/2024

## 2024-07-22 RX ORDER — ESCITALOPRAM OXALATE 20 MG/1
20 TABLET ORAL
Qty: 90 TABLET | Refills: 0 | Status: SHIPPED | OUTPATIENT
Start: 2024-07-22

## 2024-08-13 NOTE — TELEPHONE ENCOUNTER
Contacted patient in regards to scheduling breast MRI. [facility] Date, time, and location reviewed. Patient verbalized understanding.    2 FOS/spouse

## 2024-08-14 ENCOUNTER — TELEPHONE (OUTPATIENT)
Dept: OPHTHALMOLOGY | Facility: CLINIC | Age: 54
End: 2024-08-14
Payer: COMMERCIAL

## 2024-08-14 NOTE — TELEPHONE ENCOUNTER
----- Message from Jolly Woody sent at 8/14/2024  3:24 PM CDT -----  Regarding: Missed Call  Contact: ELICEO DUPREE [[id number]]  Returning a Missed Call         Caller:  ELICEO DUPREE [[id number]]           Returning call to:  Mary           Caller can be reached @: [phone] ([place])            Nature of the call:   Missed Call

## 2024-10-16 RX ORDER — SULFAMETHOXAZOLE AND TRIMETHOPRIM 800; 160 MG/1; MG/1
1 TABLET ORAL 2 TIMES DAILY
Qty: 6 TABLET | Refills: 3 | Status: SHIPPED | OUTPATIENT
Start: 2024-10-16

## 2024-10-17 ENCOUNTER — OFFICE VISIT (OUTPATIENT)
Dept: OBSTETRICS AND GYNECOLOGY | Facility: CLINIC | Age: 54
End: 2024-10-17
Payer: COMMERCIAL

## 2024-10-17 VITALS — BODY MASS INDEX: 26.49 KG/M2 | SYSTOLIC BLOOD PRESSURE: 123 MMHG | HEIGHT: 64 IN | DIASTOLIC BLOOD PRESSURE: 80 MMHG

## 2024-10-17 DIAGNOSIS — R10.2 VAGINAL PAIN: Primary | ICD-10-CM

## 2024-10-17 DIAGNOSIS — Z12.31 ENCOUNTER FOR SCREENING MAMMOGRAM FOR MALIGNANT NEOPLASM OF BREAST: ICD-10-CM

## 2024-10-17 PROCEDURE — 1159F MED LIST DOCD IN RCRD: CPT | Mod: CPTII,S$GLB,, | Performed by: OBSTETRICS & GYNECOLOGY

## 2024-10-17 PROCEDURE — 99999 PR PBB SHADOW E&M-EST. PATIENT-LVL III: CPT | Mod: PBBFAC,,, | Performed by: OBSTETRICS & GYNECOLOGY

## 2024-10-17 PROCEDURE — 0352U VAGINOSIS SCREEN BY DNA PROBE: CPT | Performed by: OBSTETRICS & GYNECOLOGY

## 2024-10-17 PROCEDURE — 3079F DIAST BP 80-89 MM HG: CPT | Mod: CPTII,S$GLB,, | Performed by: OBSTETRICS & GYNECOLOGY

## 2024-10-17 PROCEDURE — 3074F SYST BP LT 130 MM HG: CPT | Mod: CPTII,S$GLB,, | Performed by: OBSTETRICS & GYNECOLOGY

## 2024-10-17 PROCEDURE — 1160F RVW MEDS BY RX/DR IN RCRD: CPT | Mod: CPTII,S$GLB,, | Performed by: OBSTETRICS & GYNECOLOGY

## 2024-10-17 PROCEDURE — 87491 CHLMYD TRACH DNA AMP PROBE: CPT | Performed by: OBSTETRICS & GYNECOLOGY

## 2024-10-17 PROCEDURE — 3008F BODY MASS INDEX DOCD: CPT | Mod: CPTII,S$GLB,, | Performed by: OBSTETRICS & GYNECOLOGY

## 2024-10-17 PROCEDURE — 87591 N.GONORRHOEAE DNA AMP PROB: CPT | Performed by: OBSTETRICS & GYNECOLOGY

## 2024-10-17 PROCEDURE — 99213 OFFICE O/P EST LOW 20 MIN: CPT | Mod: S$GLB,,, | Performed by: OBSTETRICS & GYNECOLOGY

## 2024-10-17 PROCEDURE — 87086 URINE CULTURE/COLONY COUNT: CPT | Performed by: OBSTETRICS & GYNECOLOGY

## 2024-10-17 RX ORDER — CLOBETASOL PROPIONATE 0.5 MG/G
OINTMENT TOPICAL DAILY
Qty: 60 G | Refills: 1 | Status: SHIPPED | OUTPATIENT
Start: 2024-10-17

## 2024-10-17 NOTE — PROGRESS NOTES
"Subjective     Patient ID: Kamilah Romero is a 54 y.o. female.    Chief Complaint:  No chief complaint on file.      History of Present Illness  HPI  54 y.o.  presents with c/o redness on left vaginal opening area. Burning, raw feeling.  No discharge.  No itch.  Sexually active, no PCB.  Cycles irregular - last cycle 91 days ago, very light.  No other complaints today.     GYN & OB History  No LMP recorded. Patient is perimenopausal.   Date of Last Pap: 2022    OB History    Para Term  AB Living   1 1 1         SAB IAB Ectopic Multiple Live Births                  # Outcome Date GA Lbr Lee/2nd Weight Sex Type Anes PTL Lv   1 Term                Review of Systems  Review of Systems   Constitutional:  Negative for chills and fever.   Respiratory:  Negative for shortness of breath.    Cardiovascular:  Negative for chest pain.   Gastrointestinal:  Negative for abdominal pain, constipation and diarrhea.   Genitourinary:  Positive for vaginal pain (redness). Negative for dyspareunia, pelvic pain and vaginal discharge.   Musculoskeletal:  Negative for myalgias.   Neurological:  Negative for headaches.          Objective   Physical Exam    /80 (Patient Position: Lying)   Ht 5' 4" (1.626 m)   BMI 26.49 kg/m²     Gen: NAD  Resp: Normal respiratory effort  Abd: soft, NT/ND  Pelvic: Normal-appearing external female genitalia.  Area of thin, white tissue noted at introitus, c/w LS.  No lesions, no bleeding or drainage.  No CMT.  Uterus small, mobile, nontender.  No adnexal masses or tenderness.    Ext: normal ROM  Psych: appropriate affect  Neuro: grossly intact         Assessment and Plan     Diagnoses and all orders for this visit:    Vaginal pain  -     Vaginosis Screen by DNA Probe; Future  -     C. trachomatis/N. gonorrhoeae by AMP DNA [facility]; Vagina  -     CULTURE, URINE  -     Vaginosis Screen by DNA Probe    Encounter for screening mammogram for malignant neoplasm of breast  -     " Mammo Digital Screening Bilat; Future    Other orders  -     clobetasol 0.05% (TEMOVATE) 0.05 % Oint; Apply topically once daily.          Plan:  Exam reviewed with patient - c/w lichen slerosis, eRx clobetasol.  If no improvement in 2-3 months, would recommend biopsy.    Counseling done, precautions given, all questions answered.  RTC Omar for annual/f/u and MMG.

## 2024-10-19 LAB
BACTERIA UR CULT: NO GROWTH
BACTERIAL VAGINOSIS DNA: NOT DETECTED
C TRACH DNA SPEC QL NAA+PROBE: NOT DETECTED
CANDIDA GLABRATA/KRUSEI: NOT DETECTED
CANDIDA RRNA VAG QL PROBE: NOT DETECTED
N GONORRHOEA DNA SPEC QL NAA+PROBE: NOT DETECTED
TRICHOMONAS VAGINALIS: NOT DETECTED

## 2024-11-09 ENCOUNTER — E-VISIT (OUTPATIENT)
Dept: PRIMARY CARE CLINIC | Facility: CLINIC | Age: 54
End: 2024-11-09
Payer: COMMERCIAL

## 2024-11-09 DIAGNOSIS — R30.0 DYSURIA: Primary | ICD-10-CM

## 2024-11-09 RX ORDER — PHENAZOPYRIDINE HYDROCHLORIDE 200 MG/1
200 TABLET, FILM COATED ORAL
Qty: 6 TABLET | Refills: 0 | Status: SHIPPED | OUTPATIENT
Start: 2024-11-09 | End: 2024-11-11

## 2024-11-09 RX ORDER — SULFAMETHOXAZOLE AND TRIMETHOPRIM 800; 160 MG/1; MG/1
1 TABLET ORAL 2 TIMES DAILY
Qty: 6 TABLET | Refills: 1 | Status: SHIPPED | OUTPATIENT
Start: 2024-11-09 | End: 2024-11-15

## 2024-11-09 RX ORDER — PHENAZOPYRIDINE HYDROCHLORIDE 200 MG/1
200 TABLET, FILM COATED ORAL
Qty: 6 TABLET | Refills: 0 | Status: SHIPPED | OUTPATIENT
Start: 2024-11-09 | End: 2024-11-09

## 2024-11-09 RX ORDER — SULFAMETHOXAZOLE AND TRIMETHOPRIM 800; 160 MG/1; MG/1
1 TABLET ORAL 2 TIMES DAILY
Qty: 6 TABLET | Refills: 3 | Status: SHIPPED | OUTPATIENT
Start: 2024-11-09 | End: 2024-11-15

## 2024-11-10 NOTE — ASSESSMENT & PLAN NOTE
Bactrim DS bid x 3 days   Hydrate 6-8 glasses of water daily   Pyridium 200mg tid x 2 days as a urinary analgesic  if needed   Avoid constipation, holding urine; use hypoallergenic soaps; avoid bubble baths; take showers; wipe front to back    Submit U/A and UC if symptoms fail to resolve after taking bactrim x 3 days

## 2024-11-10 NOTE — PROGRESS NOTES
Patient ID: Kamilah Romero is a 54 y.o. female.    Chief Complaint: General Illness (Entered automatically based on patient selection in [employer].)    The patient initiated a request through [employer] on 11/9/2024 for evaluation and management with a chief complaint of General Illness (Entered automatically based on patient selection in [employer].)     I evaluated the questionnaire submission on 11/09/2024.    [facility]    11/9/2024  9:01 PM CST - Filed by Patient   What do you need help with? Urinary Symptoms   Do you agree to participate in an E-Visit? Yes   If you have any of the following symptoms, please present to your local emergency room or call 911:  I acknowledge   Select all that apply: None of the above   What is the main issue you would like addressed today? UTI   What symptoms do you currently have? Pain while passing urine   When did your symptoms first appear? 11/8/2024   List what you have done or taken to help your symptoms. Nothing   Please indicate whether you have had the following symptoms during the past 24 hours     Urgent urination (a sudden and uncontrollable urge to urinate) Moderate   Frequent urination of small amounts of urine (going to the toilet very often) Moderate   Burning pain when urinating Moderate   Incomplete bladder emptying (still feel like you need to urinate again after urination) Moderate   Pain not associated with urination in the lower abdomen below the belly button) None   What does your urine look like? I am not sure   Blood seen in the urine None   Flank pain (pain in one or both sides of the lower back) None   Abnormal Vaginal Discharge (abnormal amount, color and/or odor) None   Discharge from the urethra (urinary opening) without urination None   High body temperature/fever? None-<99.5   Please rate how much discomfort you have experience because of the symptoms in the past 24 hours: Moderate   Please indicate how the symptoms have  interfered with your every day activities/work in the past 24 hours: Moderate   Please indicate how these symptoms have interfered with your social activities (visiting people, meeting with friends, etc.) in the past 24 hours? Moderate   Are you a diabetic? No   Please indicate whether you have the following at the time of completion of this questionnaire: Signs of menopause syndrome (hot flashes)   Provide any additional information you feel is important.    Please attach any relevant images or files (if you have performed a home test for UTI, please submit a photo of results)    Are you able to take your vital signs? No         Encounter Diagnosis   Name Primary?    Dysuria Yes      Dysuria    No orders of the defined types were placed in this encounter.     Medications Ordered This Encounter   Medications    phenazopyridine (PYRIDIUM) 200 MG tablet     Sig: Take 1 tablet (200 mg total) by mouth every meal as needed for Pain.     Dispense:  6 tablet     Refill:  0   Bactrim DS bid x 3 days   Hydrate 6-8 glasses of water daily   Pyridium 200mg tid x 2 days as a urinary analgesic    Avoid constipation, holding urine; use hypoallergenic soaps; avoid bubble baths; take showers; wipe front to back    No follow-ups on file.    Submit U/A and UC if symptoms fail to resolve after taking bactrim x 3 days        E-Visit Time Tracking:

## 2024-11-11 ENCOUNTER — TELEPHONE (OUTPATIENT)
Dept: PRIMARY CARE CLINIC | Facility: CLINIC | Age: 54
End: 2024-11-11
Payer: COMMERCIAL

## 2024-11-11 NOTE — TELEPHONE ENCOUNTER
Spoke to pt she think she had a TIA on Thursday and she would like a CT. She asked me to see what you think. She has an appt in Dec and was wondering if she can get it then

## 2024-11-11 NOTE — TELEPHONE ENCOUNTER
----- Message from Myesha sent at 11/8/2024  2:44 PM CST -----  Contact: [phone]  .[email or web address]     Patient is calling for Medical Advice regarding:thinks she had a TIA     How long has patient had these symptoms:and also needs a check up as well     Pharmacy name and phone#:    Patient wants a call back or thru [facility]:call back     Comments:  She is wanting to have a cCT done next appt for you all is not until December she thinks she had a TIA yesterday please advise   Please advise patient replies from provider may take up to 48 hours.

## 2024-11-15 ENCOUNTER — OFFICE VISIT (OUTPATIENT)
Dept: INTERNAL MEDICINE | Facility: CLINIC | Age: 54
End: 2024-11-15
Payer: COMMERCIAL

## 2024-11-15 ENCOUNTER — CLINICAL SUPPORT (OUTPATIENT)
Dept: INTERNAL MEDICINE | Facility: CLINIC | Age: 54
End: 2024-11-15
Payer: COMMERCIAL

## 2024-11-15 VITALS
HEART RATE: 67 BPM | DIASTOLIC BLOOD PRESSURE: 82 MMHG | SYSTOLIC BLOOD PRESSURE: 124 MMHG | BODY MASS INDEX: 24.96 KG/M2 | WEIGHT: 146.19 LBS | OXYGEN SATURATION: 99 % | HEIGHT: 64 IN

## 2024-11-15 DIAGNOSIS — R20.0 PERIORAL NUMBNESS: ICD-10-CM

## 2024-11-15 DIAGNOSIS — R47.01 EXPRESSIVE APHASIA: Primary | ICD-10-CM

## 2024-11-15 DIAGNOSIS — Z86.69 HISTORY OF MIGRAINE: ICD-10-CM

## 2024-11-15 DIAGNOSIS — E61.8 IODINE DEFICIENCY: ICD-10-CM

## 2024-11-15 DIAGNOSIS — Z86.73 HISTORY OF CEREBELLAR STROKE: ICD-10-CM

## 2024-11-15 DIAGNOSIS — Z79.890 HORMONE REPLACEMENT THERAPY (HRT): ICD-10-CM

## 2024-11-15 DIAGNOSIS — F98.8 ATTENTION DEFICIT DISORDER, UNSPECIFIED TYPE: ICD-10-CM

## 2024-11-15 DIAGNOSIS — D53.9 MACROCYTIC ANEMIA: ICD-10-CM

## 2024-11-15 DIAGNOSIS — H83.2X3 VESTIBULAR HYPOFUNCTION, BILATERAL: ICD-10-CM

## 2024-11-15 LAB — TSH SERPL DL<=0.005 MIU/L-ACNC: 1.09 UIU/ML (ref 0.4–4)

## 2024-11-15 PROCEDURE — 84443 ASSAY THYROID STIM HORMONE: CPT | Performed by: INTERNAL MEDICINE

## 2024-11-15 PROCEDURE — 86038 ANTINUCLEAR ANTIBODIES: CPT | Performed by: INTERNAL MEDICINE

## 2024-11-15 PROCEDURE — 99999 PR PBB SHADOW E&M-EST. PATIENT-LVL I: CPT | Mod: PBBFAC,,,

## 2024-11-15 RX ORDER — TIRZEPATIDE 7.5 MG/.5ML
7.5 INJECTION, SOLUTION SUBCUTANEOUS
COMMUNITY
End: 2024-11-19 | Stop reason: SDUPTHER

## 2024-11-15 RX ORDER — ESTRADIOL 0.05 MG/D
1 FILM, EXTENDED RELEASE TRANSDERMAL
COMMUNITY
Start: 2024-11-11

## 2024-11-15 RX ORDER — LISDEXAMFETAMINE DIMESYLATE 30 MG/1
1 TABLET, CHEWABLE ORAL EVERY MORNING
COMMUNITY
Start: 2024-08-20

## 2024-11-15 RX ORDER — METHYLPHENIDATE HYDROCHLORIDE 36 MG/1
36 TABLET ORAL EVERY MORNING
COMMUNITY
Start: 2024-10-23

## 2024-11-15 RX ORDER — CLONAZEPAM 0.5 MG/1
TABLET ORAL
COMMUNITY

## 2024-11-15 RX ORDER — PROGESTERONE 200 MG/1
200 CAPSULE ORAL NIGHTLY
COMMUNITY
Start: 2024-11-11

## 2024-11-15 RX ORDER — LISDEXAMFETAMINE DIMESYLATE 60 MG/1
1 TABLET, CHEWABLE ORAL DAILY
Qty: 30 TABLET | Refills: 0 | Status: SHIPPED | OUTPATIENT
Start: 2024-11-15

## 2024-11-15 RX ORDER — LISDEXAMFETAMINE DIMESYLATE 10 MG/1
CAPSULE ORAL
COMMUNITY
Start: 2024-05-02 | End: 2024-11-15

## 2024-11-17 ENCOUNTER — PATIENT MESSAGE (OUTPATIENT)
Dept: INTERNAL MEDICINE | Facility: CLINIC | Age: 54
End: 2024-11-17
Payer: COMMERCIAL

## 2024-11-18 RX ORDER — ATOMOXETINE 18 MG/1
18 CAPSULE ORAL DAILY
Qty: 30 CAPSULE | Refills: 2 | Status: SHIPPED | OUTPATIENT
Start: 2024-11-18 | End: 2024-12-22

## 2024-11-19 ENCOUNTER — OFFICE VISIT (OUTPATIENT)
Dept: HEMATOLOGY/ONCOLOGY | Facility: CLINIC | Age: 54
End: 2024-11-19
Payer: COMMERCIAL

## 2024-11-19 ENCOUNTER — PATIENT MESSAGE (OUTPATIENT)
Dept: INTERNAL MEDICINE | Facility: CLINIC | Age: 54
End: 2024-11-19
Payer: COMMERCIAL

## 2024-11-19 VITALS
HEART RATE: 78 BPM | TEMPERATURE: 97 F | RESPIRATION RATE: 18 BRPM | BODY MASS INDEX: 25.18 KG/M2 | WEIGHT: 147.5 LBS | DIASTOLIC BLOOD PRESSURE: 81 MMHG | OXYGEN SATURATION: 100 % | HEIGHT: 64 IN | SYSTOLIC BLOOD PRESSURE: 122 MMHG

## 2024-11-19 DIAGNOSIS — Z86.73 HISTORY OF CEREBELLAR STROKE: ICD-10-CM

## 2024-11-19 DIAGNOSIS — H83.2X3 VESTIBULAR HYPOFUNCTION, BILATERAL: ICD-10-CM

## 2024-11-19 PROCEDURE — 1160F RVW MEDS BY RX/DR IN RCRD: CPT | Mod: CPTII,S$GLB,, | Performed by: INTERNAL MEDICINE

## 2024-11-19 PROCEDURE — 3074F SYST BP LT 130 MM HG: CPT | Mod: CPTII,S$GLB,, | Performed by: INTERNAL MEDICINE

## 2024-11-19 PROCEDURE — 1159F MED LIST DOCD IN RCRD: CPT | Mod: CPTII,S$GLB,, | Performed by: INTERNAL MEDICINE

## 2024-11-19 PROCEDURE — 3008F BODY MASS INDEX DOCD: CPT | Mod: CPTII,S$GLB,, | Performed by: INTERNAL MEDICINE

## 2024-11-19 PROCEDURE — 99999 PR PBB SHADOW E&M-EST. PATIENT-LVL V: CPT | Mod: PBBFAC,,, | Performed by: INTERNAL MEDICINE

## 2024-11-19 PROCEDURE — 99203 OFFICE O/P NEW LOW 30 MIN: CPT | Mod: S$GLB,,, | Performed by: INTERNAL MEDICINE

## 2024-11-19 PROCEDURE — 3079F DIAST BP 80-89 MM HG: CPT | Mod: CPTII,S$GLB,, | Performed by: INTERNAL MEDICINE

## 2024-11-19 RX ORDER — TIRZEPATIDE 7.5 MG/.5ML
7.5 INJECTION, SOLUTION SUBCUTANEOUS
Qty: 4 PEN | Refills: 3 | Status: SHIPPED | OUTPATIENT
Start: 2024-11-19

## 2024-11-19 NOTE — PROGRESS NOTES
"Service Date:  11/19/24    Chief Complaint: vestibular hypofunction, bilateral (NP)    Kamilah Romero is a 54 y.o. female here with vestibular hypofunction.  Sees Dr. Key for this. Also has hx of cerebellar infarct. Had hypercoagulable workup in the past which was all normal. No personal hx of dvt. Father has "had clots." No tobacco use. Does use an estrogen containing patch.    Review of Systems   Constitutional: Negative.  Negative for appetite change and unexpected weight change.   HENT: Negative.  Negative for mouth sores.    Eyes: Negative.  Negative for visual disturbance.   Respiratory: Negative.  Negative for cough and shortness of breath.    Cardiovascular: Negative.  Negative for chest pain.   Gastrointestinal: Negative.  Negative for abdominal pain and diarrhea.   Endocrine: Negative.    Genitourinary: Negative.  Negative for frequency.   Musculoskeletal: Negative.  Negative for back pain.   Integumentary:  Negative for rash. Negative.   Neurological: Negative.  Negative for headaches.   Hematological: Negative.  Negative for adenopathy.   Psychiatric/Behavioral: Negative.  The patient is not nervous/anxious.         Current Outpatient Medications   Medication Instructions    aspirin (ECOTRIN) 81 mg, Daily    atomoxetine (STRATTERA) 18 mg, Oral, Daily    clonazePAM (KLONOPIN) 0.5 MG tablet Take by mouth.    EScitalopram oxalate (LEXAPRO) 20 mg, Oral    estradiol 0.05 mg/24 hr td ptsw (VIVELLE-DOT) 0.05 mg/24 hr 1 patch, Twice weekly    lisdexamfetamine 60 mg Chew 1 tablet, Oral, Daily    methylphenidate HCl 36 mg, Every morning    MOUNJARO 7.5 mg, Every 7 days    progesterone (PROMETRIUM) 200 mg, Nightly    testosterone (ANDRODERM) 4 mg    VYVANSE 30 mg Chew 1 tablet, Every morning        Past Medical History:   Diagnosis Date    COVID-19 long hauler manifesting chronic neurologic symptoms 2/16/2023    Depression     stable    Lack of coordination 11/17/2022    Migraine with aura and without status " "migrainosus, not intractable 2019    Plan for Medrol for status migrainosus Sumatriptan 50mg prn Has tried Maxalt, Tylenol        Past Surgical History:   Procedure Laterality Date    BREAST BIOPSY Right 2020    BREAST BIOPSY Right 2020    VAGINAL DELIVERY          Family History   Problem Relation Name Age of Onset    Breast cancer Paternal Grandmother          unk age of onset    Cancer Maternal Grandfather          stomach ca (possibly)    Ovarian cancer Neg Hx         Social History     Tobacco Use    Smoking status: Former     Current packs/day: 0.00     Types: Cigarettes     Quit date: 10/29/2018     Years since quittin.0    Smokeless tobacco: Never   Substance Use Topics    Alcohol use: Yes     Alcohol/week: 7.0 standard drinks of alcohol     Types: 7 Glasses of wine per week    Drug use: Never         Vitals:    24 1137   BP: 122/81   Pulse: 78   Resp: 18   Temp: 96.9 °F (36.1 °C)        Physical Exam:  /81 (BP Location: Left arm, Patient Position: Sitting)   Pulse 78   Temp 96.9 °F (36.1 °C) (Temporal)   Resp 18   Ht 5' 4" (1.626 m)   Wt 66.9 kg (147 lb 7.8 oz)   SpO2 100%   BMI 25.32 kg/m²     Physical Exam  Constitutional:       Appearance: Normal appearance.   HENT:      Head: Normocephalic and atraumatic.      Nose: Nose normal.      Mouth/Throat:      Mouth: Mucous membranes are moist.      Pharynx: Oropharynx is clear.   Eyes:      Conjunctiva/sclera: Conjunctivae normal.   Cardiovascular:      Rate and Rhythm: Normal rate and regular rhythm.      Heart sounds: Normal heart sounds.   Pulmonary:      Effort: Pulmonary effort is normal.      Breath sounds: Normal breath sounds.   Abdominal:      General: Abdomen is flat. Bowel sounds are normal.      Palpations: Abdomen is soft.   Musculoskeletal:         General: Normal range of motion.      Cervical back: Normal range of motion and neck supple.   Skin:     General: Skin is warm and dry.   Neurological:      General: No " focal deficit present.      Mental Status: She is alert and oriented to person, place, and time. Mental status is at baseline.   Psychiatric:         Mood and Affect: Mood normal.          Labs:  Lab Results   Component Value Date    WBC 5.76 10/04/2023    RBC 4.37 10/04/2023    HGB 14.3 10/04/2023    HCT 42.1 10/04/2023    MCV 96 10/04/2023    MCH 32.7 (H) 10/04/2023    MCHC 34.0 10/04/2023    RDW 11.5 10/04/2023     10/04/2023    MPV 11.7 10/04/2023    GRAN 3.5 10/04/2023    GRAN 60.1 10/04/2023    LYMPH 1.8 10/04/2023    LYMPH 31.9 10/04/2023    MONO 0.3 10/04/2023    MONO 5.6 10/04/2023    EOS 0.1 10/04/2023    BASO 0.03 10/04/2023    EOSINOPHIL 1.6 10/04/2023    BASOPHIL 0.5 10/04/2023     Sodium   Date Value Ref Range Status   10/04/2023 138 136 - 145 mmol/L Final     Potassium   Date Value Ref Range Status   10/04/2023 3.9 3.5 - 5.1 mmol/L Final     Chloride   Date Value Ref Range Status   10/04/2023 103 95 - 110 mmol/L Final     CO2   Date Value Ref Range Status   10/04/2023 29 23 - 29 mmol/L Final     Glucose   Date Value Ref Range Status   10/04/2023 91 70 - 110 mg/dL Final     BUN   Date Value Ref Range Status   10/04/2023 12 6 - 20 mg/dL Final     Creatinine   Date Value Ref Range Status   10/04/2023 1.0 0.5 - 1.4 mg/dL Final     Calcium   Date Value Ref Range Status   10/04/2023 9.3 8.7 - 10.5 mg/dL Final     Total Protein   Date Value Ref Range Status   10/04/2023 6.7 6.0 - 8.4 g/dL Final     Albumin   Date Value Ref Range Status   10/04/2023 4.1 3.5 - 5.2 g/dL Final     Total Bilirubin   Date Value Ref Range Status   10/04/2023 0.4 0.1 - 1.0 mg/dL Final     Comment:     For infants and newborns, interpretation of results should be based  on gestational age, weight and in agreement with clinical  observations.    Premature Infant recommended reference ranges:  Up to 24 hours.............<8.0 mg/dL  Up to 48 hours............<12.0 mg/dL  3-5 days..................<15.0 mg/dL  6-29  days.................<15.0 mg/dL       Alkaline Phosphatase   Date Value Ref Range Status   10/04/2023 35 (L) 55 - 135 U/L Final     AST   Date Value Ref Range Status   10/04/2023 19 10 - 40 U/L Final     ALT   Date Value Ref Range Status   10/04/2023 15 10 - 44 U/L Final     Anion Gap   Date Value Ref Range Status   10/04/2023 6 (L) 8 - 16 mmol/L Final       A/P:    Vestibular dysfunction/history of cerebellar infarct  -I encouraged her to continue to follow up with her current physicians into follow up with Neurology   -I explained to her that as she has already had hypercoagulable workup in his on antiplatelet therapy, I have nothing further to offer her at this point.  -I recommended that she can return to clinic if needed in the future but otherwise she will keep her appointment with her neurologist coming up.      Aurash Khoobehi, MD  Hematology and Oncology
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
## 2024-12-02 DIAGNOSIS — R35.0 URINARY FREQUENCY: Primary | ICD-10-CM

## 2024-12-02 RX ORDER — CIPROFLOXACIN 500 MG/1
500 TABLET ORAL EVERY 12 HOURS
Qty: 10 TABLET | Refills: 0 | Status: SHIPPED | OUTPATIENT
Start: 2024-12-02

## 2024-12-03 ENCOUNTER — PATIENT MESSAGE (OUTPATIENT)
Dept: INTERNAL MEDICINE | Facility: CLINIC | Age: 54
End: 2024-12-03
Payer: COMMERCIAL

## 2024-12-07 ENCOUNTER — HOSPITAL ENCOUNTER (OUTPATIENT)
Dept: RADIOLOGY | Facility: HOSPITAL | Age: 54
Discharge: HOME | End: 2024-12-07
Attending: INTERNAL MEDICINE
Payer: COMMERCIAL

## 2024-12-07 DIAGNOSIS — Z86.73 HISTORY OF CEREBELLAR STROKE: ICD-10-CM

## 2024-12-07 DIAGNOSIS — H83.2X3 VESTIBULAR HYPOFUNCTION, BILATERAL: ICD-10-CM

## 2024-12-07 DIAGNOSIS — R20.0 PERIORAL NUMBNESS: ICD-10-CM

## 2024-12-07 DIAGNOSIS — R47.01 EXPRESSIVE APHASIA: ICD-10-CM

## 2024-12-07 PROCEDURE — A9585 GADOBUTROL INJECTION: HCPCS | Performed by: INTERNAL MEDICINE

## 2024-12-07 PROCEDURE — 70553 MRI BRAIN STEM W/O & W/DYE: CPT | Mod: TC

## 2024-12-07 PROCEDURE — 70553 MRI BRAIN STEM W/O & W/DYE: CPT | Mod: 26,,, | Performed by: RADIOLOGY

## 2024-12-07 PROCEDURE — 25500020 PHARM REV CODE 255: Performed by: INTERNAL MEDICINE

## 2024-12-07 RX ORDER — GADOBUTROL 604.72 MG/ML
7 INJECTION INTRAVENOUS
Status: COMPLETED | OUTPATIENT
Start: 2024-12-07 | End: 2024-12-07

## 2024-12-07 RX ADMIN — GADOBUTROL 7 ML: 604.72 INJECTION INTRAVENOUS at 03:12

## 2024-12-11 ENCOUNTER — LAB VISIT (OUTPATIENT)
Dept: LAB | Facility: HOSPITAL | Age: 54
End: 2024-12-11
Attending: INTERNAL MEDICINE
Payer: COMMERCIAL

## 2024-12-11 DIAGNOSIS — R20.0 PERIORAL NUMBNESS: ICD-10-CM

## 2024-12-11 DIAGNOSIS — Z79.890 HORMONE REPLACEMENT THERAPY (HRT): ICD-10-CM

## 2024-12-11 DIAGNOSIS — Z86.73 HISTORY OF CEREBELLAR STROKE: ICD-10-CM

## 2024-12-11 DIAGNOSIS — D53.9 MACROCYTIC ANEMIA: ICD-10-CM

## 2024-12-11 DIAGNOSIS — R47.01 EXPRESSIVE APHASIA: ICD-10-CM

## 2024-12-11 LAB
ALBUMIN SERPL BCP-MCNC: 4 G/DL (ref 3.5–5.2)
ALP SERPL-CCNC: 36 U/L (ref 40–150)
ALT SERPL W/O P-5'-P-CCNC: 12 U/L (ref 10–44)
ANION GAP SERPL CALC-SCNC: 9 MMOL/L (ref 8–16)
AST SERPL-CCNC: 17 U/L (ref 10–40)
BASOPHILS # BLD AUTO: 0.03 K/UL (ref 0–0.2)
BASOPHILS NFR BLD: 0.7 % (ref 0–1.9)
BILIRUB SERPL-MCNC: 0.4 MG/DL (ref 0.1–1)
BUN SERPL-MCNC: 10 MG/DL (ref 6–20)
CALCIUM SERPL-MCNC: 9.7 MG/DL (ref 8.7–10.5)
CHLORIDE SERPL-SCNC: 104 MMOL/L (ref 95–110)
CO2 SERPL-SCNC: 22 MMOL/L (ref 23–29)
CREAT SERPL-MCNC: 0.9 MG/DL (ref 0.5–1.4)
DIFFERENTIAL METHOD BLD: ABNORMAL
EOSINOPHIL # BLD AUTO: 0.1 K/UL (ref 0–0.5)
EOSINOPHIL NFR BLD: 1.2 % (ref 0–8)
ERYTHROCYTE [DISTWIDTH] IN BLOOD BY AUTOMATED COUNT: 12.8 % (ref 11.5–14.5)
EST. GFR  (NO RACE VARIABLE): >60 ML/MIN/1.73 M^2
GLUCOSE SERPL-MCNC: 87 MG/DL (ref 70–110)
HCT VFR BLD AUTO: 42.4 % (ref 37–48.5)
HCYS SERPL-SCNC: 4.9 UMOL/L (ref 4–15.5)
HGB BLD-MCNC: 14.8 G/DL (ref 12–16)
IMM GRANULOCYTES # BLD AUTO: 0.01 K/UL (ref 0–0.04)
IMM GRANULOCYTES NFR BLD AUTO: 0.2 % (ref 0–0.5)
LYMPHOCYTES # BLD AUTO: 1.7 K/UL (ref 1–4.8)
LYMPHOCYTES NFR BLD: 39.3 % (ref 18–48)
MCH RBC QN AUTO: 33 PG (ref 27–31)
MCHC RBC AUTO-ENTMCNC: 34.9 G/DL (ref 32–36)
MCV RBC AUTO: 95 FL (ref 82–98)
MONOCYTES # BLD AUTO: 0.3 K/UL (ref 0.3–1)
MONOCYTES NFR BLD: 7.7 % (ref 4–15)
NEUTROPHILS # BLD AUTO: 2.2 K/UL (ref 1.8–7.7)
NEUTROPHILS NFR BLD: 50.9 % (ref 38–73)
NRBC BLD-RTO: 0 /100 WBC
PLATELET # BLD AUTO: 248 K/UL (ref 150–450)
PMV BLD AUTO: 11.3 FL (ref 9.2–12.9)
POTASSIUM SERPL-SCNC: 4.3 MMOL/L (ref 3.5–5.1)
PROT SERPL-MCNC: 6.7 G/DL (ref 6–8.4)
RBC # BLD AUTO: 4.48 M/UL (ref 4–5.4)
SODIUM SERPL-SCNC: 135 MMOL/L (ref 136–145)
WBC # BLD AUTO: 4.27 K/UL (ref 3.9–12.7)

## 2024-12-11 PROCEDURE — 36415 COLL VENOUS BLD VENIPUNCTURE: CPT | Mod: PN | Performed by: INTERNAL MEDICINE

## 2024-12-11 PROCEDURE — 80053 COMPREHEN METABOLIC PANEL: CPT | Performed by: INTERNAL MEDICINE

## 2024-12-11 PROCEDURE — 85025 COMPLETE CBC W/AUTO DIFF WBC: CPT | Performed by: INTERNAL MEDICINE

## 2024-12-11 PROCEDURE — 83090 ASSAY OF HOMOCYSTEINE: CPT | Performed by: INTERNAL MEDICINE

## 2024-12-16 RX ORDER — OSELTAMIVIR PHOSPHATE 75 MG/1
75 CAPSULE ORAL DAILY
Qty: 10 CAPSULE | Refills: 0 | Status: SHIPPED | OUTPATIENT
Start: 2024-12-16 | End: 2024-12-26

## 2024-12-16 NOTE — PROGRESS NOTES
Date:  2024    ?  Referring Provider:   Crissy Kerr MD    Copies of Letters to the Following:   Crissy Kerr MD    Chief Complaint:  I saw Kamilah Romero at the [facility] for neuro-ophthalmic evaluation.   She is a 54 y.o. female with a history of cerebellar stroke who presents for evaluation of blurred vision post stroke.    History:     HPI    Referred by Dr. Kerr/ Dr. Fatima    Blurry vision OU  Cataracts OU  Keratoconjunctivitis sicca OU  Bilateral vestibular hyperfunction    Patient here for blurred vision OU. Patient states symptoms started   2023 and was diagnosed with stroke in 2023. Feels like she   is in a constant brain fog. Was prescribed beta histamine for 6 months but   didn't notice any improvement. Has trouble focusing and multiple focal   lengths and has to use several different pairs of glasses.         Last edited by Eden Segal MA on 2024  9:18 AM.        ?  Current Outpatient Medications   Medication Sig Dispense Refill    aspirin (ECOTRIN) 81 MG EC tablet Take 81 mg by mouth once daily.      EScitalopram oxalate (LEXAPRO) 20 MG tablet TAKE 1 TABLET BY MOUTH ONCE DAILY. 90 tablet 0    estradiol 0.05 mg/24 hr td ptsw (VIVELLE-DOT) 0.05 mg/24 hr Place 1 patch onto the skin twice a week.      oseltamivir (TAMIFLU) 75 MG capsule Take 1 capsule (75 mg total) by mouth once daily. for 10 days 10 capsule 0    progesterone (PROMETRIUM) 200 MG capsule Take 200 mg by mouth every evening.      testosterone (ANDRODERM) 4 mg/24 hr PT24 Place 4 mg onto the skin. Testosterone cr 4 mg/gm cream ; si.5 ml daily      tirzepatide (MOUNJARO) 7.5 mg/0.5 mL PnIj Inject 7.5 mg into the skin every 7 days. 4 Pen 3    VYVANSE 30 mg Chew Take 1 tablet by mouth every morning.      atomoxetine (STRATTERA) 18 MG capsule Take 1 capsule (18 mg total) by mouth once daily. (Patient not taking: Reported on 2024) 30 capsule 2    ciprofloxacin HCl (CIPRO) 500 MG  tablet Take 1 tablet (500 mg total) by mouth every 12 (twelve) hours. (Patient not taking: Reported on 2024) 10 tablet 0    clonazePAM (KLONOPIN) 0.5 MG tablet Take by mouth. (Patient not taking: Reported on 2024)      lisdexamfetamine 60 mg Chew Take 1 tablet by mouth once daily. (Patient not taking: Reported on 2024) 30 tablet 0    methylphenidate HCl 36 MG CR tablet Take 36 mg by mouth every morning. (Patient not taking: Reported on 2024)       No current facility-administered medications for this visit.     Review of patient's allergies indicates:  No Known Allergies  Past Medical History:   Diagnosis Date    COVID-19 long hasamuel manifesting chronic neurologic symptoms 2023    Depression     stable    Lack of coordination 2022    Migraine with aura and without status migrainosus, not intractable 2019    Plan for Medrol for status migrainosus Sumatriptan 50mg prn Has tried Maxalt, Tylenol     Past Surgical History:   Procedure Laterality Date    BREAST BIOPSY Right 2020    BREAST BIOPSY Right 2020    VAGINAL DELIVERY       Family History   Problem Relation Name Age of Onset    Breast cancer Paternal Grandmother          unk age of onset    Cancer Maternal Grandfather          stomach ca (possibly)    Ovarian cancer Neg Hx       Social History     Socioeconomic History    Marital status:    Tobacco Use    Smoking status: Former     Current packs/day: 0.00     Types: Cigarettes     Quit date: 10/29/2018     Years since quittin.1    Smokeless tobacco: Never   Substance and Sexual Activity    Alcohol use: Yes     Alcohol/week: 7.0 standard drinks of alcohol     Types: 7 Glasses of wine per week    Drug use: Never    Sexual activity: Yes     Partners: Male     Social Drivers of Health     Financial Resource Strain: Low Risk  (2024)    Overall Financial Resource Strain (CARDIA)     Difficulty of Paying Living Expenses: Not hard at all   Food Insecurity: No  Food Insecurity (1/11/2024)    Hunger Vital Sign     Worried About Running Out of Food in the Last Year: Never true     Ran Out of Food in the Last Year: Never true   Transportation Needs: No Transportation Needs (1/11/2024)    PRAPARE - Transportation     Lack of Transportation (Medical): No     Lack of Transportation (Non-Medical): No   Physical Activity: Sufficiently Active (1/11/2024)    Exercise Vital Sign     Days of Exercise per Week: 4 days     Minutes of Exercise per Session: 50 min   Stress: No Stress Concern Present (1/11/2024)    [facility] [employer] [facility] - Occupational Stress Questionnaire     Feeling of Stress : Not at all   Housing Stability: Low Risk  (1/11/2024)    Housing Stability Vital Sign     Unable to Pay for Housing in the Last Year: No     Number of Places Lived in the Last Year: 1     Unstable Housing in the Last Year: No       Examination:  She was well-appearing. She was alert and oriented. Attention span and concentration were normal. Speech, language, memory, and general knowledge were intact.      Her distance visual acuity without correction was 20/40  (PH 20/30) in the right eye and 20/25  in the left eye.  Her near visual acuity without correction was J16 in the right eye and J10 in the left eye.      She perceived 8/8 OD and 8/8 OS Ishihara color plates correctly. Pupils were brisk to light without an afferent defect. Ocular ductions were full. Orthophoric in primary, right, and left gaze by cross cover. There was no nystagmus. Saccades and pursuits were normal. Lids were symmetric. NPC 2-3 cm     Optic discs appeared normal without swelling or pallor. Pupillary dilation was not necessary for visualization of the optic disc today.     Laboratories Reviewed:     N/a  ?  Neuroimaging Reviewed:     12/7/2024 MRI brain w.wo contrast  There is no evidence of hydrocephalus mass effect intracranial hemorrhage or acute infarct.     Chronic deep white matter infarct on the  right and chronic small right cerebellar infarct again noted.  Single nonspecific subcortical white matter lesion in the right frontal lobe unchanged.     No new parenchymal signal abnormality identified.     No enhancing lesion.     Normal arterial flow voids are preserved at the skull base.     The visualized sinuses and mastoid air cells are clear.     Impression:     No acute intracranial process or enhancing intracranial lesion.  Stable appearance of the brain when compared to the prior study with small chronic infarcts again noted.  ?  Ocular Imaging, Photos, Records Reviewed:     OCT RNFL Today 12/17/2024:   Right Eye - Average RNFL 94 all segments normal   Left Eye - Average RNFL 92 all segments normal     Normal macular architecture OU    Visual Field Test 24-2 OU Today 12/17/2024: Right Eye - fixation losses 7/11, false positives 0%, false negatives 0%, MD -0.48dB, Impression OD: full. Left Eye - fixation losses 0/10, false positives 0%, false negatives 0%, MD -1.33dB, Impression OS: few scattered mild points, nonspecific.  ?  Impression:  Kamilah Romero has history of cerebellar stroke who presents for evaluation of blurred vision post stroke. They report COVID in 1/2023 followed by persistent dizziness and brain fog. She had MRI with evidence of a remote right cerebellar stroke but this was not felt to be responsible for her symptoms. She had hypercoagulable state workup given stroke at young age with limited comorbidities, history of miscarriage and father with DVTs. This workup was unrevealing. She reports constant sensation of movement like after a few alcoholic drinks which is exacerbated by visually stimulating environments, bright light and sound. She has been evaluated and treated by Dr. Wagner with betahistine with limited success. She also mentions a recent episode when she had aphasia for about 20-30 minutes with no after-coming headaches. Neuro-ophthalmologic examination was notable for  good visual acuities, full color vision, normal ocular motility and alignment. OCT with normal and symmetric peripapillary and macular RNFL thicknesses OU. Formal visual fields were without any concerning pattern of vision loss.    I suspect she may be suffering from PPPD (persistent postural perceptual dizziness) and acephalgic migraine with aura. I advised to discuss treatment aimed at PPPD with her neurologist as well as starting vitamin supplements for migraine prevention.  ?  Plan:  1. Riboflavin 400 mg daily  2. CoEnzyme Q10 400 mg daily  3. Follow up with optometry/ophthalmology for yearly routine eye exams and refraction needs    Follow-up:  I will see her in follow-up on an as needed basis.    ?  ?  Visit Checklist (as applicable):  1. Status of new and prior symptoms discussed? yes  2. Neuroimaging reviewed/ ordered as appropriate? yes  3. Ocular imaging and photos reviewed/ ordered as appropriate? yes  4. Plan for work-up and treatment discussed with patient? yes  5. Potential medication side-effects and monitoring plan discussed? yes  6. Review of outside medical records was performed and pertinent details are summarized in the HPI above? yes    Time spent on this encounter: 60 minutes. This includes face to face time and non-face to face time preparing to see the patient (eg, review of tests), obtaining and/or reviewing separately obtained history, documenting clinical information in the electronic or other health record, independently interpreting results and communicating results to the patient/family/caregiver, or care coordinator.      RAAD Hernandez  Neuro-Ophthalmology Consultant

## 2024-12-17 ENCOUNTER — CLINICAL SUPPORT (OUTPATIENT)
Dept: OPHTHALMOLOGY | Facility: CLINIC | Age: 54
End: 2024-12-17
Payer: COMMERCIAL

## 2024-12-17 ENCOUNTER — OFFICE VISIT (OUTPATIENT)
Dept: OPHTHALMOLOGY | Facility: CLINIC | Age: 54
End: 2024-12-17
Payer: COMMERCIAL

## 2024-12-17 DIAGNOSIS — Z86.73 HISTORY OF CEREBELLAR STROKE: ICD-10-CM

## 2024-12-17 DIAGNOSIS — H53.15 VISUAL DISTORTIONS OF SHAPE AND SIZE: ICD-10-CM

## 2024-12-17 DIAGNOSIS — H81.8X9 PERSISTENT POSTURAL-PERCEPTUAL DIZZINESS: Primary | ICD-10-CM

## 2024-12-17 PROCEDURE — 92083 EXTENDED VISUAL FIELD XM: CPT | Mod: S$GLB,,, | Performed by: STUDENT IN AN ORGANIZED HEALTH CARE EDUCATION/TRAINING PROGRAM

## 2024-12-17 PROCEDURE — 99205 OFFICE O/P NEW HI 60 MIN: CPT | Mod: S$GLB,,, | Performed by: STUDENT IN AN ORGANIZED HEALTH CARE EDUCATION/TRAINING PROGRAM

## 2024-12-17 PROCEDURE — 1159F MED LIST DOCD IN RCRD: CPT | Mod: CPTII,S$GLB,, | Performed by: STUDENT IN AN ORGANIZED HEALTH CARE EDUCATION/TRAINING PROGRAM

## 2024-12-17 PROCEDURE — 92133 CPTRZD OPH DX IMG PST SGM ON: CPT | Mod: S$GLB,,, | Performed by: STUDENT IN AN ORGANIZED HEALTH CARE EDUCATION/TRAINING PROGRAM

## 2024-12-17 PROCEDURE — 1160F RVW MEDS BY RX/DR IN RCRD: CPT | Mod: CPTII,S$GLB,, | Performed by: STUDENT IN AN ORGANIZED HEALTH CARE EDUCATION/TRAINING PROGRAM

## 2024-12-17 PROCEDURE — 99999 PR PBB SHADOW E&M-EST. PATIENT-LVL III: CPT | Mod: PBBFAC,,, | Performed by: STUDENT IN AN ORGANIZED HEALTH CARE EDUCATION/TRAINING PROGRAM

## 2024-12-17 RX ORDER — TIRZEPATIDE 7.5 MG/.5ML
7.5 INJECTION, SOLUTION SUBCUTANEOUS
COMMUNITY
Start: 2024-11-20 | End: 2024-12-17 | Stop reason: SDUPTHER

## 2024-12-17 NOTE — LETTER
[facility] - [address] [place] [address]  [address]  [facility] [address]  Phone: [phone]  Fax: [phone]   December 17, 2024    Crissy Kerr MD  [address]  [facility] [address]    Patient: Kamilah Romero   MR Number: [id number]   YOB: 1970   Date of Visit: 12/17/2024       Dear Dr. Kerr :    Thank you for referring Kamilah Romero to me for evaluation. Here is my assessment and plan of care:    Impression:  Kamilah Romero has history of cerebellar stroke who presents for evaluation of blurred vision post stroke. They report COVID in 1/2023 followed by persistent dizziness and brain fog. She had MRI with evidence of a remote right cerebellar stroke but this was not felt to be responsible for her symptoms. She had hypercoagulable state workup given stroke at young age with limited comorbidities, history of miscarriage and father with DVTs. This workup was unrevealing. She reports constant sensation of movement like after a few alcoholic drinks which is exacerbated by visually stimulating environments, bright light and sound. She has been evaluated and treated by Dr. Wagner with betahistine with limited success. She also mentions a recent episode when she had aphasia for about 20-30 minutes with no after-coming headaches. Neuro-ophthalmologic examination was notable for good visual acuities, full color vision, normal ocular motility and alignment. OCT with normal and symmetric peripapillary and macular RNFL thicknesses OU. Formal visual fields were without any concerning pattern of vision loss.    I suspect she may be suffering from PPPD (persistent postural perceptual dizziness) and acephalgic migraine with aura. I advised to discuss treatment aimed at PPPD with her neurologist as well as starting vitamin supplements for migraine prevention.     Plan:  1. Riboflavin 400 mg daily  2. CoEnzyme Q10 400 mg daily  3. Follow up with optometry/ophthalmology for yearly routine eye  exams and refraction needs    Follow-up:  I will see her in follow-up on an as needed basis.    If you have questions, please do not hesitate to call me. I look forward to following Ms. Kamilah Angelita Nick along with you.    Sincerely,        Milka Strong MD       CC  No Recipients

## 2024-12-18 ENCOUNTER — PATIENT MESSAGE (OUTPATIENT)
Dept: OPHTHALMOLOGY | Facility: CLINIC | Age: 54
End: 2024-12-18
Payer: COMMERCIAL

## 2024-12-18 LAB
ONEOME COMMENT: NORMAL
ONEOME METHOD: NORMAL

## 2024-12-19 ENCOUNTER — CLINICAL SUPPORT (OUTPATIENT)
Dept: INTERNAL MEDICINE | Facility: CLINIC | Age: 54
End: 2024-12-19
Payer: COMMERCIAL

## 2024-12-19 DIAGNOSIS — H83.2X3 VESTIBULAR HYPOFUNCTION, BILATERAL: Primary | ICD-10-CM

## 2024-12-19 RX ORDER — METHYLPREDNISOLONE SOD SUCC 125 MG
125 VIAL (EA) INJECTION
Status: COMPLETED | OUTPATIENT
Start: 2024-12-19 | End: 2024-12-19

## 2024-12-19 RX ADMIN — Medication 125 MG: at 02:12

## 2024-12-27 ENCOUNTER — PATIENT MESSAGE (OUTPATIENT)
Dept: INTERNAL MEDICINE | Facility: CLINIC | Age: 54
End: 2024-12-27
Payer: COMMERCIAL

## 2025-01-09 DIAGNOSIS — F98.8 ATTENTION DEFICIT DISORDER, UNSPECIFIED TYPE: ICD-10-CM

## 2025-01-09 DIAGNOSIS — H83.2X3 VESTIBULAR HYPOFUNCTION, BILATERAL: Primary | ICD-10-CM

## 2025-01-09 RX ORDER — CLONAZEPAM 0.5 MG/1
0.5 TABLET ORAL DAILY PRN
Qty: 30 TABLET | Refills: 0 | Status: SHIPPED | OUTPATIENT
Start: 2025-01-09

## 2025-01-09 RX ORDER — LISDEXAMFETAMINE DIMESYLATE 60 MG/1
1 TABLET, CHEWABLE ORAL DAILY
Qty: 30 TABLET | Refills: 0 | Status: SHIPPED | OUTPATIENT
Start: 2025-01-09

## 2025-01-16 ENCOUNTER — HOSPITAL ENCOUNTER (OUTPATIENT)
Dept: RADIOLOGY | Facility: OTHER | Age: 55
Discharge: HOME | End: 2025-01-16
Attending: OBSTETRICS & GYNECOLOGY
Payer: COMMERCIAL

## 2025-01-16 DIAGNOSIS — Z12.31 ENCOUNTER FOR SCREENING MAMMOGRAM FOR MALIGNANT NEOPLASM OF BREAST: ICD-10-CM

## 2025-01-16 PROCEDURE — 77067 SCR MAMMO BI INCL CAD: CPT | Mod: 26,,, | Performed by: RADIOLOGY

## 2025-01-16 PROCEDURE — 77067 SCR MAMMO BI INCL CAD: CPT | Mod: TC

## 2025-01-16 PROCEDURE — 77063 BREAST TOMOSYNTHESIS BI: CPT | Mod: 26,,, | Performed by: RADIOLOGY

## 2025-01-21 ENCOUNTER — PATIENT MESSAGE (OUTPATIENT)
Dept: OBSTETRICS AND GYNECOLOGY | Facility: CLINIC | Age: 55
End: 2025-01-21
Payer: COMMERCIAL

## 2025-01-21 DIAGNOSIS — Z91.89 AT HIGH RISK FOR BREAST CANCER: Primary | ICD-10-CM

## 2025-01-30 ENCOUNTER — DOCUMENTATION ONLY (OUTPATIENT)
Dept: INTERNAL MEDICINE | Facility: CLINIC | Age: 55
End: 2025-01-30
Payer: COMMERCIAL

## 2025-02-07 DIAGNOSIS — Z79.890 HORMONE REPLACEMENT THERAPY (HRT): Primary | ICD-10-CM

## 2025-02-07 DIAGNOSIS — H83.2X3 VESTIBULAR HYPOFUNCTION, BILATERAL: ICD-10-CM

## 2025-02-07 RX ORDER — VENLAFAXINE HYDROCHLORIDE 37.5 MG/1
37.5 CAPSULE, EXTENDED RELEASE ORAL
COMMUNITY

## 2025-02-07 RX ORDER — CLONAZEPAM 0.5 MG/1
0.5 TABLET ORAL DAILY PRN
Qty: 30 TABLET | Refills: 2 | Status: SHIPPED | OUTPATIENT
Start: 2025-02-07

## 2025-02-07 RX ORDER — ESTRADIOL 0.05 MG/D
1 FILM, EXTENDED RELEASE TRANSDERMAL
Qty: 8 PATCH | Refills: 0 | Status: SHIPPED | OUTPATIENT
Start: 2025-02-10

## 2025-02-07 RX ORDER — ESCITALOPRAM OXALATE 10 MG/1
10 TABLET ORAL DAILY
COMMUNITY

## 2025-02-07 RX ORDER — ATORVASTATIN CALCIUM 40 MG/1
40 TABLET, FILM COATED ORAL
COMMUNITY

## 2025-02-09 RX ORDER — OSELTAMIVIR PHOSPHATE 75 MG/1
75 CAPSULE ORAL 2 TIMES DAILY
Qty: 10 CAPSULE | Refills: 0 | Status: SHIPPED | OUTPATIENT
Start: 2025-02-09 | End: 2025-02-14

## 2025-02-13 ENCOUNTER — OFFICE VISIT (OUTPATIENT)
Dept: OBSTETRICS AND GYNECOLOGY | Facility: CLINIC | Age: 55
End: 2025-02-13
Payer: COMMERCIAL

## 2025-02-13 VITALS
HEIGHT: 64 IN | BODY MASS INDEX: 23.98 KG/M2 | DIASTOLIC BLOOD PRESSURE: 70 MMHG | SYSTOLIC BLOOD PRESSURE: 113 MMHG | WEIGHT: 140.44 LBS

## 2025-02-13 DIAGNOSIS — Z91.89 AT HIGH RISK FOR BREAST CANCER: ICD-10-CM

## 2025-02-13 DIAGNOSIS — Z12.4 SCREENING FOR CERVICAL CANCER: ICD-10-CM

## 2025-02-13 DIAGNOSIS — N93.9 VAGINAL SPOTTING: ICD-10-CM

## 2025-02-13 DIAGNOSIS — Z01.419 WELL WOMAN EXAM WITH ROUTINE GYNECOLOGICAL EXAM: Primary | ICD-10-CM

## 2025-02-13 PROCEDURE — 88175 CYTOPATH C/V AUTO FLUID REDO: CPT | Performed by: OBSTETRICS & GYNECOLOGY

## 2025-02-13 PROCEDURE — 87624 HPV HI-RISK TYP POOLED RSLT: CPT | Performed by: OBSTETRICS & GYNECOLOGY

## 2025-02-13 RX ORDER — TIRZEPATIDE 2.5 MG/.5ML
2.5 INJECTION, SOLUTION SUBCUTANEOUS
COMMUNITY

## 2025-02-13 NOTE — PROGRESS NOTES
"Subjective     Patient ID: Kamilah Romero is a 54 y.o. female.    Chief Complaint:  Well Woman      History of Present Illness  HPI  54 y.o.  presents for annual exam.  Overall doing well.  Treated with clobetasol last visit due to area of lichen sclerosis - reports this has completely resolved, no longer using the cream.  Has not quite gone a full year without cycles yet - ~290 days.  She does report a vaginal discharge, possible blood-tinged.  Sexually active without issues.  No other complaints today.  Last pap .  MMG in  Birads 1 with TC score of 27.97%.      GYN & OB History  Patient's last menstrual period was 02/10/2025 (exact date).   Date of Last Pap: 2022    OB History    Para Term  AB Living   4 1 1   3     SAB IAB Ectopic Multiple Live Births   3              # Outcome Date GA Lbr Lee/2nd Weight Sex Type Anes PTL Lv   4 SAB            3 SAB            2 SAB            1 Term                Review of Systems  Review of Systems   Constitutional:  Negative for chills and fever.   Respiratory:  Negative for shortness of breath.    Cardiovascular:  Negative for chest pain.   Gastrointestinal:  Negative for abdominal pain, constipation and diarrhea.   Genitourinary:  Positive for vaginal discharge. Negative for dyspareunia and pelvic pain.   Musculoskeletal:  Negative for myalgias.   Neurological:  Negative for headaches.          Objective   Physical Exam    /70   Ht 5' 4" (1.626 m)   Wt 63.7 kg (140 lb 6.9 oz)   LMP 02/10/2025 (Exact Date)   BMI 24.11 kg/m²     Gen: NAD  Resp: Normal respiratory effort  Breast: Symmetric, nontender.  No masses.  No skin changes.  No nipple discharge.   Abd: soft, NT  Pelvic: Normal-appearing external female genitalia.  No blood on exam.  No CMT.  Uterus small, mobile, nontender.  No adnexal masses or tenderness.    Ext: normal ROM  Psych: appropriate affect  Neuro: grossly intact         Assessment and Plan     Kamilah was seen " today for well woman.    Diagnoses and all orders for this visit:    Well woman exam with routine gynecological exam    Screening for cervical cancer  -     Liquid-Based Pap Smear, Screening  -     HPV High Risk Genotypes, PCR    At high risk for breast cancer  -     MRI Breast w/o Contrast, Bilateral; Future    Vaginal spotting  -     US Pelvis Comp with Transvag NON-OB (xpd; Future          Plan:  Routine annual exam with pap smear and breast exam today.  Declined STD screening.  Breast MRI and MMG q 6 month due to increased lifetime risk - orders placed today.   Will get pelvic US due to spotting, exam benign today.   Upon discussion, she does report she is on HRT - discussed with patient that I do not recommend this, as she has a history of stroke in the past.  Recommend discussing with doctor who prescribes her HRT.    Counseling done, precautions given, all questions answered.  RTC 1 year for annual, or prn.

## 2025-02-17 ENCOUNTER — HOSPITAL ENCOUNTER (OUTPATIENT)
Dept: RADIOLOGY | Facility: OTHER | Age: 55
Discharge: HOME | End: 2025-02-17
Attending: OBSTETRICS & GYNECOLOGY
Payer: COMMERCIAL

## 2025-02-17 DIAGNOSIS — N93.9 VAGINAL SPOTTING: ICD-10-CM

## 2025-02-17 PROCEDURE — 76856 US EXAM PELVIC COMPLETE: CPT | Mod: TC

## 2025-02-20 ENCOUNTER — RESULTS FOLLOW-UP (OUTPATIENT)
Dept: OBSTETRICS AND GYNECOLOGY | Facility: CLINIC | Age: 55
End: 2025-02-20

## 2025-03-11 DIAGNOSIS — Z79.890 POST-MENOPAUSE ON HRT (HORMONE REPLACEMENT THERAPY): Primary | ICD-10-CM

## 2025-03-11 RX ORDER — TESTOSTERONE 50 MG/5G
GEL TRANSDERMAL
Qty: 30 G | Refills: 2 | Status: SHIPPED | OUTPATIENT
Start: 2025-03-11

## 2025-03-18 DIAGNOSIS — Z79.890 HORMONE REPLACEMENT THERAPY (HRT): ICD-10-CM

## 2025-03-18 RX ORDER — ESTRADIOL 0.05 MG/D
1 FILM, EXTENDED RELEASE TRANSDERMAL
Qty: 24 PATCH | Refills: 1 | Status: SHIPPED | OUTPATIENT
Start: 2025-03-20

## 2025-03-25 ENCOUNTER — OFFICE VISIT (OUTPATIENT)
Dept: INTERNAL MEDICINE | Facility: CLINIC | Age: 55
End: 2025-03-25
Payer: COMMERCIAL

## 2025-03-25 ENCOUNTER — LAB VISIT (OUTPATIENT)
Dept: LAB | Facility: HOSPITAL | Age: 55
End: 2025-03-25
Payer: COMMERCIAL

## 2025-03-25 VITALS
HEART RATE: 80 BPM | HEIGHT: 64 IN | SYSTOLIC BLOOD PRESSURE: 98 MMHG | BODY MASS INDEX: 23.31 KG/M2 | OXYGEN SATURATION: 98 % | DIASTOLIC BLOOD PRESSURE: 60 MMHG | WEIGHT: 136.56 LBS

## 2025-03-25 DIAGNOSIS — F43.21 SITUATIONAL DEPRESSION: Primary | ICD-10-CM

## 2025-03-25 DIAGNOSIS — Z79.890 POST-MENOPAUSE ON HRT (HORMONE REPLACEMENT THERAPY): ICD-10-CM

## 2025-03-25 DIAGNOSIS — R53.83 FATIGUE, UNSPECIFIED TYPE: ICD-10-CM

## 2025-03-25 DIAGNOSIS — H83.2X3 VESTIBULAR HYPOFUNCTION, BILATERAL: ICD-10-CM

## 2025-03-25 LAB
ESTRADIOL SERPL HS-MCNC: 79 PG/ML
PROGEST SERPL-MCNC: 4.8 NG/ML
TSH SERPL-ACNC: 1.25 UIU/ML (ref 0.4–4)

## 2025-03-25 PROCEDURE — 3074F SYST BP LT 130 MM HG: CPT | Mod: CPTII,S$GLB,, | Performed by: INTERNAL MEDICINE

## 2025-03-25 PROCEDURE — 1160F RVW MEDS BY RX/DR IN RCRD: CPT | Mod: CPTII,S$GLB,, | Performed by: INTERNAL MEDICINE

## 2025-03-25 PROCEDURE — 84403 ASSAY OF TOTAL TESTOSTERONE: CPT

## 2025-03-25 PROCEDURE — 82530 CORTISOL FREE: CPT

## 2025-03-25 PROCEDURE — 3008F BODY MASS INDEX DOCD: CPT | Mod: CPTII,S$GLB,, | Performed by: INTERNAL MEDICINE

## 2025-03-25 PROCEDURE — 99999 PR PBB SHADOW E&M-EST. PATIENT-LVL III: CPT | Mod: PBBFAC,,, | Performed by: INTERNAL MEDICINE

## 2025-03-25 PROCEDURE — 1159F MED LIST DOCD IN RCRD: CPT | Mod: CPTII,S$GLB,, | Performed by: INTERNAL MEDICINE

## 2025-03-25 PROCEDURE — 84443 ASSAY THYROID STIM HORMONE: CPT

## 2025-03-25 PROCEDURE — 99214 OFFICE O/P EST MOD 30 MIN: CPT | Mod: S$GLB,,, | Performed by: INTERNAL MEDICINE

## 2025-03-25 PROCEDURE — 82679 ASSAY OF ESTRONE: CPT

## 2025-03-25 PROCEDURE — 84144 ASSAY OF PROGESTERONE: CPT

## 2025-03-25 PROCEDURE — 82672 ASSAY OF ESTROGEN: CPT

## 2025-03-25 PROCEDURE — 82670 ASSAY OF TOTAL ESTRADIOL: CPT

## 2025-03-25 PROCEDURE — 3078F DIAST BP <80 MM HG: CPT | Mod: CPTII,S$GLB,, | Performed by: INTERNAL MEDICINE

## 2025-03-25 PROCEDURE — 36415 COLL VENOUS BLD VENIPUNCTURE: CPT

## 2025-03-25 RX ORDER — VENLAFAXINE HYDROCHLORIDE 75 MG/1
75 CAPSULE, EXTENDED RELEASE ORAL DAILY
Qty: 30 CAPSULE | Refills: 2 | Status: SHIPPED | OUTPATIENT
Start: 2025-03-25 | End: 2025-04-04

## 2025-03-26 ENCOUNTER — RESULTS FOLLOW-UP (OUTPATIENT)
Dept: INTERNAL MEDICINE | Facility: CLINIC | Age: 55
End: 2025-03-26

## 2025-03-28 LAB — ESTRONE SERPL-MCNC: 69 PG/ML

## 2025-04-01 NOTE — PROGRESS NOTES
Subjective:       Patient ID: Kamilah Romero is a 54 y.o. female who presents today for:    Chief Complaint:   Chief Complaint   Patient presents with    Follow-up       History of Present Illness    CHIEF COMPLAINT:  Patient presents today for follow up of worsening mood symptoms    PSYCHIATRIC MEDICATIONS:  She recently switched from Lexapro 20mg to Venlafaxine 37.5mg and reports increased crying and emotional symptoms, stating she feels like crying almost constantly. She discontinued Vyvanse over one month ago and stopped Klonopin within the last month but more than two weeks ago. She is unable to identify what effect Klonopin had on her symptoms.    SUPPLEMENTS AND OTHER MEDICATIONS:  She takes riboflavin and CoQ10 400 units daily as recommended by neurologist. She was taking BPC-157 and TB-500 for inflammation, and started RetroTruTide on January 17th, but recently discontinued all three for approximately one week.    RECENT ILLNESS:  She recently contracted a cold from her son.      ROS:  General: -fever, -chills, -fatigue, -weight gain, -weight loss  Eyes: -vision changes, -redness, -discharge  ENT: -ear pain, -nasal congestion, -sore throat  Cardiovascular: -chest pain, -palpitations, -lower extremity edema  Respiratory: -cough, -shortness of breath  Gastrointestinal: -abdominal pain, -nausea, -vomiting, -diarrhea, -constipation, -blood in stool  Genitourinary: -dysuria, -hematuria, -frequency  Musculoskeletal: -joint pain, -muscle pain  Skin: -rash, -lesion  Neurological: -headache, -dizziness, -numbness, -tingling, +inner restlessness  Psychiatric: -anxiety, +depression, -sleep difficulty, +excessive crying, +anger problems           Medications:  Encounter Medications[1]    Allergies:  Review of patient's allergies indicates:  No Known Allergies    Health Maintenance:    There is no immunization history on file for this patient.   Health Maintenance   Topic Date Due    Hepatitis C Screening  Never done  "   HIV Screening  Never done    TETANUS VACCINE  Never done    Shingles Vaccine (1 of 2) Never done    Pneumococcal Vaccines (Age 50+) (1 of 2 - PCV) Never done    COVID-19 Vaccine (2 - Pfizer risk series) 09/07/2021    Influenza Vaccine (1) Never done    Mammogram  01/30/2026    Colorectal Cancer Screening  05/15/2029    Lipid Panel  10/15/2029    Cervical Cancer Screening  02/13/2030    RSV Vaccine (Age 60+ and Pregnant patients) (1 - 1-dose 75+ series) 05/03/2044          Objective:      Vital Signs  Pulse: 80  SpO2: 98 %  BP: 98/60  Patient Position: Sitting  Pain Score: 0-No pain  Height and Weight  Height: 5' 4" (162.6 cm)  Weight: 62 kg (136 lb 9.2 oz)  BSA (Calculated - sq m): 1.67 sq meters  BMI (Calculated): 23.4  Weight in (lb) to have BMI = 25: 145.3]    Physical Exam   Physical Exam    General: No acute distress. Well-developed. Well-nourished.  Eyes: EOMI. Sclerae anicteric.  HENT: Normocephalic. Atraumatic.  Cardiovascular: Regular rate. Regular rhythm. No murmurs. No rubs. No gallops. Normal S1, S2.  Respiratory: Normal respiratory effort. Clear to auscultation bilaterally. No rales. No rhonchi. No wheezing.  Abdomen: Soft. Non-tender. Non-distended. Normoactive bowel sounds.  Musculoskeletal: No  obvious deformity.  Extremities: No lower extremity edema.  Neurological: Alert & oriented x3. No slurred speech. Normal gait.  Psychiatric: Normal mood. Normal affect. Good insight. Good judgment.  Skin: Warm. Dry. No rash.        Assessment/plan:     Kamilah Romero is a 54 y.o.female with:    Situational depression  -     venlafaxine (EFFEXOR-XR) 75 MG 24 hr capsule; Take 1 capsule (75 mg total) by mouth once daily.  Dispense: 30 capsule; Refill: 2    Post-menopause on HRT (hormone replacement therapy)  -     Estrogens, Total; Future; Expected date: 03/25/2025  -     Estradiol; Future; Expected date: 03/25/2025  -     Estrone; Future; Expected date: 03/25/2025  -     Progesterone; Future; Expected " date: 03/25/2025  -     Testosterone Panel; Future; Expected date: 03/25/2025    Vestibular hypofunction, bilateral  -     Cortisol, free, serum; Future; Expected date: 03/25/2025    Fatigue, unspecified type  -     TSH; Future; Expected date: 03/25/2025      Assessment & Plan    F32.9 Major depressive disorder, single episode, unspecified  F43.23 Adjustment disorder with mixed anxiety and depressed mood  J06.9 Acute upper respiratory infection, unspecified  R45.83 Excessive crying of child, adolescent or adult    IMPRESSION:  - Assessed recent depressive symptoms likely due to abrupt transition from Lexapro 20 mg to low-dose venlafaxine (37.5 mg), resulting in insufficient serotonin reuptake inhibition.  - Considered impact of recent discontinuation of Klonopin and Vyvanse on mood and energy levels.  - Evaluated potential benefits of increasing venlafaxine dosage to address serotonin deficiency and provide additional norepinephrine effects.  - Reviewed current supplement regimen, including peptides and RetroTrue Tide, determining they are likely benign and not contributing to mood changes.  - Assessed hormone replacement therapy needs and ordered comprehensive estrogen panel including estradiol, estrone, and total estrogen labs as requested by naturopath.    MAJOR DEPRESSIVE DISORDER:  - Explained the mechanism of action for SSRIs and SNRIs, specifically how they affect serotonin and norepinephrine levels in the brain.  - Discussed differences between various antidepressant classes and their effects on mood regulation.  - Increased venlafaxine from 37.5 mg to 75 mg daily for 30 days.  - Instructed the patient to take 2 37.5 mg venlafaxine tablets daily until new prescription is filled.  - Assessed that the patient is experiencing depression due to medication change and serotonin deficiency.  - Discussed the patient's depression in relation to medication changes and seasonal factors.  - Ordered labs for hormones.  -  Patient reports feeling worse, experiencing depression, and crying frequently.  - Patient reports feeling flat and depressed.  - Increased venlafaxine dosage to 75 mg for 30 days.  - Patient reports feeling out of control and desperate.  - Assessed that the patient's excessive crying is due to serotonin depletion from medication change.  - Increased venlafaxine dosage to 75 mg for 30 days to address excessive crying.  - Patient reports wanting to cry frequently.    ADJUSTMENT DISORDER WITH MIXED ANXIETY AND DEPRESSION:  - Explained the role of benzodiazepines like Klonopin in managing anxiety and potential vestibular symptoms.  - Assessed that the patient is experiencing anxiety and depression due to medication changes.  - Discussed the patient's condition in relation to medication changes and life stressors.    ACUTE UPPER RESPIRATORY INFECTION:  - Patient reports having recently contracted an upper respiratory infection from her son.        Future Appointments   Date Time Provider Department Center   7/16/2025 12:00 PM [facility] MRI1 350 LB LIMIT [facility]     This note was generated with the assistance of ambient listening technology. Verbal consent was obtained by the patient and accompanying visitor(s) for the recording of patient appointment to facilitate this note. I attest to having reviewed and edited the generated note for accuracy, though some syntax or spelling errors may persist. Please contact the author of this note for any clarification.     Rose Trivedi MD  [facility]       [1]   Outpatient Encounter Medications as of 3/25/2025   Medication Sig Dispense Refill    aspirin (ECOTRIN) 81 MG EC tablet Take 81 mg by mouth once daily.      atorvastatin (LIPITOR) 40 MG tablet Take 40 mg by mouth.      betahistine HCl (BETAHISTINE, BULK, MISC) by Misc.(Non-Drug; Combo Route) route.      clonazePAM (KLONOPIN) 0.5 MG tablet Take 1 tablet (0.5 mg total) by mouth daily as needed for  Anxiety. 30 tablet 2    EScitalopram oxalate (LEXAPRO) 10 MG tablet Take 10 mg by mouth once daily.      estradiol 0.05 mg/24 hr td ptsw (VIVELLE-DOT) 0.05 mg/24 hr Place 1 patch onto the skin twice a week. 24 patch 1    lisdexamfetamine 60 mg Chew Take 1 tablet by mouth once daily. 30 tablet 0    methylphenidate HCl 36 MG CR tablet Take 36 mg by mouth every morning.      progesterone (PROMETRIUM) 200 MG capsule Take 200 mg by mouth every evening.      testosterone (TESTIM) 50 mg/5 gram (1 %) Gel 4mg/gm cream : Sig apply one pump (0.5 ml ) to inner thigh QD disp: 30 g 2    tirzepatide (MOUNJARO) 2.5 mg/0.5 mL PnIj Inject 2.5 mg into the skin.      tirzepatide (MOUNJARO) 7.5 mg/0.5 mL PnIj Inject 7.5 mg into the skin every 7 days. 4 Pen 3    UNABLE TO FIND Testosterone cream. 0.4 % two times per day ?  Progesterone cream 200 mg daily/nightly ?      venlafaxine (EFFEXOR-XR) 37.5 MG 24 hr capsule Take 37.5 mg by mouth.      venlafaxine (EFFEXOR-XR) 75 MG 24 hr capsule Take 1 capsule (75 mg total) by mouth once daily. 30 capsule 2    VYVANSE 30 mg Chew Take 1 tablet by mouth every morning.       No facility-administered encounter medications on file as of 3/25/2025.

## 2025-04-04 RX ORDER — VENLAFAXINE HYDROCHLORIDE 150 MG/1
150 CAPSULE, EXTENDED RELEASE ORAL DAILY
Qty: 30 CAPSULE | Refills: 1 | Status: SHIPPED | OUTPATIENT
Start: 2025-04-04 | End: 2026-04-04

## 2025-05-30 RX ORDER — PROGESTERONE 200 MG/1
200 CAPSULE ORAL NIGHTLY
Qty: 90 CAPSULE | Refills: 1 | Status: SHIPPED | OUTPATIENT
Start: 2025-05-30

## 2025-06-11 RX ORDER — VENLAFAXINE HYDROCHLORIDE 150 MG/1
150 CAPSULE, EXTENDED RELEASE ORAL DAILY
Qty: 90 CAPSULE | Refills: 1 | Status: SHIPPED | OUTPATIENT
Start: 2025-06-11 | End: 2026-06-11

## 2025-07-15 RX ORDER — BUPROPION HYDROCHLORIDE 150 MG/1
150 TABLET ORAL DAILY
Qty: 30 TABLET | Refills: 2 | Status: SHIPPED | OUTPATIENT
Start: 2025-07-15 | End: 2026-07-15

## 2025-07-16 ENCOUNTER — HOSPITAL ENCOUNTER (OUTPATIENT)
Dept: RADIOLOGY | Facility: OTHER | Age: 55
Discharge: HOME | End: 2025-07-16
Attending: OBSTETRICS & GYNECOLOGY
Payer: COMMERCIAL

## 2025-07-16 DIAGNOSIS — Z91.89 AT HIGH RISK FOR BREAST CANCER: ICD-10-CM

## 2025-07-16 PROCEDURE — 25500020 PHARM REV CODE 255: Performed by: OBSTETRICS & GYNECOLOGY

## 2025-07-16 PROCEDURE — A9577 INJ MULTIHANCE: HCPCS | Performed by: OBSTETRICS & GYNECOLOGY

## 2025-07-16 PROCEDURE — 77049 MRI BREAST C-+ W/CAD BI: CPT | Mod: 26,,, | Performed by: RADIOLOGY

## 2025-07-16 PROCEDURE — 77049 MRI BREAST C-+ W/CAD BI: CPT | Mod: TC

## 2025-07-16 RX ADMIN — GADOBENATE DIMEGLUMINE 12 ML: 529 INJECTION, SOLUTION INTRAVENOUS at 12:07

## 2025-07-23 ENCOUNTER — TELEPHONE (OUTPATIENT)
Dept: INTERNAL MEDICINE | Facility: CLINIC | Age: 55
End: 2025-07-23
Payer: COMMERCIAL

## 2025-08-30 ENCOUNTER — TELEPHONE (OUTPATIENT)
Dept: INTERNAL MEDICINE | Facility: CLINIC | Age: 55
End: 2025-08-30
Payer: COMMERCIAL

## 2025-09-04 ENCOUNTER — OFFICE VISIT (OUTPATIENT)
Dept: OBSTETRICS AND GYNECOLOGY | Facility: CLINIC | Age: 55
End: 2025-09-04
Attending: OBSTETRICS & GYNECOLOGY
Payer: COMMERCIAL

## 2025-09-04 VITALS
HEIGHT: 64 IN | DIASTOLIC BLOOD PRESSURE: 70 MMHG | BODY MASS INDEX: 23.18 KG/M2 | WEIGHT: 135.81 LBS | SYSTOLIC BLOOD PRESSURE: 118 MMHG

## 2025-09-04 DIAGNOSIS — N90.89 VULVAR LESION: Primary | ICD-10-CM

## 2025-09-04 DIAGNOSIS — N76.0 ACUTE VAGINITIS: ICD-10-CM

## 2025-09-04 PROCEDURE — 99999 PR PBB SHADOW E&M-EST. PATIENT-LVL III: CPT | Mod: PBBFAC,,, | Performed by: OBSTETRICS & GYNECOLOGY

## 2025-09-04 PROCEDURE — 87529 HSV DNA AMP PROBE: CPT | Performed by: OBSTETRICS & GYNECOLOGY

## 2025-09-04 RX ORDER — VALACYCLOVIR HYDROCHLORIDE 500 MG/1
500 TABLET, FILM COATED ORAL 2 TIMES DAILY
Qty: 10 TABLET | Refills: 1 | Status: SHIPPED | OUTPATIENT
Start: 2025-09-04 | End: 2025-09-09

## 2025-09-04 RX ORDER — EVOLOCUMAB 140 MG/ML
140 INJECTION, SOLUTION SUBCUTANEOUS
COMMUNITY
Start: 2025-04-14